# Patient Record
Sex: MALE | Race: WHITE | NOT HISPANIC OR LATINO | Employment: OTHER | URBAN - METROPOLITAN AREA
[De-identification: names, ages, dates, MRNs, and addresses within clinical notes are randomized per-mention and may not be internally consistent; named-entity substitution may affect disease eponyms.]

---

## 2019-02-12 DIAGNOSIS — M54.16 LUMBAR RADICULOPATHY, CHRONIC: ICD-10-CM

## 2019-02-12 DIAGNOSIS — M54.16 LUMBAR RADICULOPATHY: Primary | ICD-10-CM

## 2019-07-16 ENCOUNTER — OFFICE VISIT (OUTPATIENT)
Dept: FAMILY MEDICINE | Facility: CLINIC | Age: 69
End: 2019-07-16
Payer: MEDICARE

## 2019-07-16 VITALS
DIASTOLIC BLOOD PRESSURE: 60 MMHG | OXYGEN SATURATION: 97 % | BODY MASS INDEX: 30.88 KG/M2 | SYSTOLIC BLOOD PRESSURE: 119 MMHG | HEART RATE: 63 BPM | RESPIRATION RATE: 16 BRPM | WEIGHT: 228 LBS | HEIGHT: 72 IN

## 2019-07-16 DIAGNOSIS — E78.1 HYPERTRIGLYCERIDEMIA: ICD-10-CM

## 2019-07-16 DIAGNOSIS — M51.27 PROLAPSED LUMBOSACRAL INTERVERTEBRAL DISC: ICD-10-CM

## 2019-07-16 DIAGNOSIS — C61 ADENOCARCINOMA OF PROSTATE: Primary | ICD-10-CM

## 2019-07-16 DIAGNOSIS — R76.8 RHEUMATOID FACTOR POSITIVE: ICD-10-CM

## 2019-07-16 DIAGNOSIS — G89.29 CHRONIC RIGHT FLANK PAIN: ICD-10-CM

## 2019-07-16 DIAGNOSIS — R10.9 CHRONIC RIGHT FLANK PAIN: ICD-10-CM

## 2019-07-16 PROCEDURE — 99214 OFFICE O/P EST MOD 30 MIN: CPT | Mod: ICN,CMP,S$GLB, | Performed by: NURSE PRACTITIONER

## 2019-07-16 PROCEDURE — 99214 PR OFFICE/OUTPT VISIT, EST, LEVL IV, 30-39 MIN: ICD-10-PCS | Mod: ICN,CMP,S$GLB, | Performed by: NURSE PRACTITIONER

## 2019-07-16 RX ORDER — AMOXICILLIN 500 MG
CAPSULE ORAL DAILY
COMMUNITY

## 2019-07-16 NOTE — ASSESSMENT & PLAN NOTE
Discussed possible pathologies including adhesions from surgery, Lumbar radiculopathy, and liver disease (fatty vs autoimmune vs cirrhosis).  Hepatitis C negative.    Discussed with patient that CT scan would be needed to investigate.  Given his upcoming surgery, would defer this until his pain level changes. He is scheduled for October, so we will discuss at that time.       Will review labs from upcoming procedure once received.

## 2019-07-16 NOTE — PROGRESS NOTES
Subjective:      Patient ID: Mike Rome is a 68 y.o. male.    Chief Complaint: 6mn f/u (Prediabetic, macrocytosis.)      Patient is here today for 6 month follow-up.  He reports no acute issues at this time.  He reports being seen by Urology and diagnosis with adeno carcinoma of the prostate.  He is scheduled to have laparoscopic removal on Wednesday.  He is currently asymptomatic.  He is followed by Dr. Miranda.           ROS:   Review of Systems   Constitutional: Negative.    Respiratory: Negative.    Cardiovascular: Negative.    Gastrointestinal: Negative.    Genitourinary: Negative.    Musculoskeletal: Positive for back pain (R flank (chronic)).   Skin: Negative.    Neurological: Negative.    Hematological: Negative.      Objective:   Physical Exam   Constitutional: He is oriented to person, place, and time. He appears well-developed and well-nourished. No distress.   HENT:   Head: Normocephalic and atraumatic.   Eyes: Pupils are equal, round, and reactive to light.   Neck: Normal range of motion.   Cardiovascular: Normal rate, regular rhythm and normal heart sounds.   Pulmonary/Chest: Effort normal and breath sounds normal.   Musculoskeletal: He exhibits no edema.   Neurological: He is alert and oriented to person, place, and time.   Skin: Skin is dry.   Psychiatric: He has a normal mood and affect. His behavior is normal. Judgment and thought content normal.   Nursing note and vitals reviewed.    Assessment:     1. Adenocarcinoma of prostate    2. Rheumatoid factor positive    3. Hypertriglyceridemia    4. Prolapsed lumbosacral intervertebral disc    5. Chronic right flank pain      Plan:     Problem List Items Addressed This Visit        Neuro    Prolapsed lumbosacral intervertebral disc    Overview     Was referred to PT for evaluation. Reports unable to reproduce pain at that time, so he stopped going to PT.    See MRI results for L4-5 pathology.          Current Assessment & Plan     Discussed possible  causes of the pain in his right flank. Defers PT. States he can do the exercises at home. Pain is 1/10 and only when seated.             Immunology/Multi System    Rheumatoid factor positive    Overview     Followed by Dr. Velazquez.     Was offered to come back in 1 year. PT deferred 2/t asymptomatic.          Current Assessment & Plan     Continue to monitor         Relevant Orders    Lipid panel       Oncology    Adenocarcinoma of prostate - Primary    Overview     T2 high volume [place] 4+3 equals 7 adenocarcinoma.     Followed by Dr. Miranda.          Relevant Orders    Lipid panel       GI    Chronic right flank pain    Overview     Sitting.  1/10 when present. No radiculopathy. GB removed.     US showed some fatty liver. Kidneys normal in appearance per US.    MRI showed possible pathology. Deferred PT evaluation.    Hep C negative.          Current Assessment & Plan     Discussed possible pathologies including adhesions from surgery, Lumbar radiculopathy, and liver disease (fatty vs autoimmune vs cirrhosis).  Hepatitis C negative.    Discussed with patient that CT scan would be needed to investigate.  Given his upcoming surgery, would defer this until his pain level changes. He is scheduled for October, so we will discuss at that time.       Will review labs from upcoming procedure once received.            Other Visit Diagnoses     Hypertriglyceridemia        Relevant Orders    Lipid panel

## 2019-07-17 LAB
CHOLEST SERPL-MSCNC: 190 MG/DL
HDL/CHOLESTEROL RATIO: 7 RATIO
HDLC SERPL-MCNC: 25 MG/DL (ref 32–72)
LDLC SERPL CALC-MCNC: 98 MG/DL
TRIGL SERPL-MCNC: 339 MG/DL (ref 30–200)
VLDL CHOLESTEROL: 67 MG/DL (ref 0–40)

## 2019-10-10 ENCOUNTER — OFFICE VISIT (OUTPATIENT)
Dept: FAMILY MEDICINE | Facility: CLINIC | Age: 69
End: 2019-10-10
Payer: MEDICARE

## 2019-10-10 VITALS
SYSTOLIC BLOOD PRESSURE: 120 MMHG | OXYGEN SATURATION: 96 % | DIASTOLIC BLOOD PRESSURE: 58 MMHG | BODY MASS INDEX: 28.31 KG/M2 | RESPIRATION RATE: 16 BRPM | WEIGHT: 209 LBS | HEART RATE: 56 BPM | HEIGHT: 72 IN

## 2019-10-10 DIAGNOSIS — Z00.00 MEDICARE ANNUAL WELLNESS VISIT, SUBSEQUENT: Primary | ICD-10-CM

## 2019-10-10 DIAGNOSIS — C61 ADENOCARCINOMA OF PROSTATE: ICD-10-CM

## 2019-10-10 DIAGNOSIS — Z79.899 LONG TERM USE OF DRUG: ICD-10-CM

## 2019-10-10 PROCEDURE — 99397 PR PREVENTIVE VISIT,EST,65 & OVER: ICD-10-PCS | Mod: S$GLB,,, | Performed by: NURSE PRACTITIONER

## 2019-10-10 PROCEDURE — 99397 PER PM REEVAL EST PAT 65+ YR: CPT | Mod: S$GLB,,, | Performed by: NURSE PRACTITIONER

## 2019-10-10 NOTE — PROGRESS NOTES
Mike Rome presented for a follow-up Medicare AWV today. The following components were reviewed and updated:    · Medical history  · Family History  · Social history  · Allergies and Current Medications  · Health Risk Assessment  · Health Maintenance  · Care Team    **See Completed Assessments for Annual Wellness visit with in the encounter summary    The following assessments were completed:  · Depression Screening  · Cognitive function Screening  · Timed Get Up Test  · Whisper Test    Vitals:    10/10/19 0929   BP: (!) 120/58   Pulse: (!) 56   Resp: 16   SpO2: 96%   Weight: 94.8 kg (209 lb)   Height: 6' (1.829 m)     Body mass index is 28.35 kg/m².   ]        Diagnoses and health risks identified today and associated recommendations/orders:  1. Medicare annual wellness visit, subsequent  Wife has upcoming visit next Thursday. He will get labs tomorrow and we will discuss at her visit.   - Lipid panel; Future  - CBC auto differential; Future  - Comprehensive metabolic panel; Future  - Hemoglobin A1c; Future  - TSH; Future  - Lipid panel  - CBC auto differential  - Comprehensive metabolic panel  - Hemoglobin A1c  - TSH    2. Adenocarcinoma of prostate    PT is s/p Prostatectomy back in July. Doing well at this time. No complaints other than occasional dribbling.                         3. Long term use of drug    - Lipid panel; Future  - CBC auto differential; Future  - Comprehensive metabolic panel; Future  - Hemoglobin A1c; Future  - TSH; Future  - Lipid panel  - CBC auto differential  - Comprehensive metabolic panel  - Hemoglobin A1c  - TSH      Provided Mike with a 5-10 year written screening schedule and personal prevention plan. Recommendations were developed using the USPSTF age appropriate recommendations. Education, counseling, and referrals were provided as needed.  After Visit Summary printed and given to patient which includes a list of additional screenings\tests needed.    Follow up in about 6  months (around 4/10/2020).      Skyler Francis NP

## 2019-10-14 ENCOUNTER — TELEPHONE (OUTPATIENT)
Dept: FAMILY MEDICINE | Facility: CLINIC | Age: 69
End: 2019-10-14

## 2019-10-14 DIAGNOSIS — D72.810 LYMPHOCYTOPENIA: Primary | ICD-10-CM

## 2019-10-14 DIAGNOSIS — E78.5 HYPERLIPIDEMIA, UNSPECIFIED HYPERLIPIDEMIA TYPE: ICD-10-CM

## 2019-10-14 NOTE — TELEPHONE ENCOUNTER
WBC 4.13 - Third low value since 2017. Recommend B12, B6, Folate, Vit D, iron, zinc testing to r/o Vit Def as cause of leukopenia/lymphocytopenia. History of Macrocytosis.     Start Cholesterol med.     TSH nml

## 2019-10-15 NOTE — TELEPHONE ENCOUNTER
Pt notified - only add-on's not done is zinc & B6. Pt states he and his wife have apts tomorrow. But he will get the labs done in the next 2 days and f/u w/ us regarding other results/choles med.

## 2019-10-17 RX ORDER — SIMVASTATIN 20 MG/1
20 TABLET, FILM COATED ORAL NIGHTLY
Qty: 90 TABLET | Refills: 3 | Status: SHIPPED | OUTPATIENT
Start: 2019-10-17 | End: 2020-10-23

## 2019-10-17 RX ORDER — ACETAMINOPHEN, DEXTROMETHORPHAN HBR, DOXYLAMINE SUCCINATE, PHENYLEPHRINE HCL 650; 20; 12.5; 1 MG/30ML; MG/30ML; MG/30ML; MG/30ML
1 SOLUTION ORAL DAILY
Qty: 100 EACH | Refills: 3 | Status: SHIPPED | OUTPATIENT
Start: 2019-10-17 | End: 2021-06-21

## 2019-10-17 RX ORDER — ASPIRIN 325 MG
50000 TABLET, DELAYED RELEASE (ENTERIC COATED) ORAL WEEKLY
Qty: 12 CAPSULE | Refills: 0 | Status: SHIPPED | OUTPATIENT
Start: 2019-10-17 | End: 2021-06-21

## 2019-10-17 RX ORDER — FOLIC ACID 0.4 MG
400 TABLET ORAL DAILY
Refills: 0 | COMMUNITY
Start: 2019-10-17 | End: 2021-06-21

## 2019-10-17 NOTE — TELEPHONE ENCOUNTER
{PT here today w/ his wife for AWV.     Notified of abnormal lab values.     Vit D 26  B12  - 296  Folate - 4.8   Iron - wnl   Mg - 2.6   B6 - pending  Zinc - pending    R/o Pernicious Anemia. Start Oral Vit D and B12. Recommend repeat CBC in 6 weeks as well as B12/folate levels.     Given CBC, B12 order to be done on 11/25/19. RX of B12, folate, Vit D sent to pharmacy.     Also sent Simvastatin given .

## 2019-11-26 ENCOUNTER — TELEPHONE (OUTPATIENT)
Dept: FAMILY MEDICINE | Facility: CLINIC | Age: 69
End: 2019-11-26

## 2019-11-26 NOTE — TELEPHONE ENCOUNTER
WBC improved - confirmed B12/folate cause.   RBC 4.8, hgb 15.4, hct 48.3    - macrocytosis  MCH 32     B12 - 492 (on supplements).  Prior 296   Folate 4.8  (on folvite 400) -  Prior 4.8     Will determine if taking supplements.      MTHFR vs Pernicious anemia  -  Plan to test IF Ab and start Methylfolate if negative.

## 2019-12-03 ENCOUNTER — OFFICE VISIT (OUTPATIENT)
Dept: FAMILY MEDICINE | Facility: CLINIC | Age: 69
End: 2019-12-03
Payer: MEDICARE

## 2019-12-03 VITALS
DIASTOLIC BLOOD PRESSURE: 67 MMHG | BODY MASS INDEX: 28.31 KG/M2 | HEART RATE: 70 BPM | RESPIRATION RATE: 14 BRPM | SYSTOLIC BLOOD PRESSURE: 152 MMHG | WEIGHT: 209 LBS | HEIGHT: 72 IN | OXYGEN SATURATION: 96 %

## 2019-12-03 DIAGNOSIS — D51.8 MACROCYTIC ANEMIA WITH VITAMIN B12 DEFICIENCY: Primary | ICD-10-CM

## 2019-12-03 PROCEDURE — 1159F MED LIST DOCD IN RCRD: CPT | Mod: S$GLB,,, | Performed by: NURSE PRACTITIONER

## 2019-12-03 PROCEDURE — 1126F PR PAIN SEVERITY QUANTIFIED, NO PAIN PRESENT: ICD-10-PCS | Mod: S$GLB,,, | Performed by: NURSE PRACTITIONER

## 2019-12-03 PROCEDURE — 99213 PR OFFICE/OUTPT VISIT, EST, LEVL III, 20-29 MIN: ICD-10-PCS | Mod: S$GLB,,, | Performed by: NURSE PRACTITIONER

## 2019-12-03 PROCEDURE — 1159F PR MEDICATION LIST DOCUMENTED IN MEDICAL RECORD: ICD-10-PCS | Mod: S$GLB,,, | Performed by: NURSE PRACTITIONER

## 2019-12-03 PROCEDURE — 99213 OFFICE O/P EST LOW 20 MIN: CPT | Mod: S$GLB,,, | Performed by: NURSE PRACTITIONER

## 2019-12-03 PROCEDURE — 1126F AMNT PAIN NOTED NONE PRSNT: CPT | Mod: S$GLB,,, | Performed by: NURSE PRACTITIONER

## 2019-12-03 RX ORDER — LEVOMEFOLATE CALCIUM 7.5 MG
7.5 TABLET ORAL DAILY
Qty: 90 TABLET | Refills: 0 | Status: SHIPPED | OUTPATIENT
Start: 2019-12-03 | End: 2022-01-11

## 2019-12-03 NOTE — ASSESSMENT & PLAN NOTE
Lmethylfolate 7.5 mg sent to [facility].  PT will stop B12/Folic acid once RX has been received. Suspect he may have MTHFR given his lack of response to oral supplements.     See overview.

## 2019-12-03 NOTE — PROGRESS NOTES
Subjective:      Patient ID: Mike Rome is a 69 y.o. male.    Chief Complaint: Follow-up (Here to disucss labs.)      Patient is here today to discuss folic acid and B12 deficiency.  He is currently on B12 1000 mcg daily as well as folic acid 400 mcg daily.  He reports no acute issues at this time.     Past Medical History:   Diagnosis Date    Abnormal prostate specific antigen (PSA)     Cholelithiasis without obstruction     Erectile dysfunction     Gallstone     Hyperglycemia     Hyperlipidemia     Polyp of colon     Prediabetes     Prolapsed lumbosacral intervertebral disc     Rheumatoid factor positive     Shoulder joint pain     rt      Social History     Socioeconomic History    Marital status:      Spouse name: Not on file    Number of children: Not on file    Years of education: Not on file    Highest education level: Not on file   Occupational History    Not on file   Social Needs    Financial resource strain: Not on file    Food insecurity:     Worry: Not on file     Inability: Not on file    Transportation needs:     Medical: Not on file     Non-medical: Not on file   Tobacco Use    Smoking status: Current Every Day Smoker     Packs/day: 1.00     Years: 50.00     Pack years: 50.00     Types: Cigarettes    Smokeless tobacco: Never Used   Substance and Sexual Activity    Alcohol use: Yes     Frequency: 2-4 times a month    Drug use: Never    Sexual activity: Not on file   Lifestyle    Physical activity:     Days per week: Not on file     Minutes per session: Not on file    Stress: Not on file   Relationships    Social connections:     Talks on phone: Not on file     Gets together: Not on file     Attends Mosque service: Not on file     Active member of club or organization: Not on file     Attends meetings of clubs or organizations: Not on file     Relationship status: Not on file   Other Topics Concern    Not on file   Social History Narrative    Not on file       History reviewed. No pertinent family history.     ROS:   Review of Systems   Constitutional: Negative.    Respiratory: Negative.    Cardiovascular: Negative.    Gastrointestinal: Negative.    Genitourinary: Negative.    Musculoskeletal: Positive for back pain (R flank (chronic)).   Skin: Negative.    Neurological: Negative.    Hematological: Negative.      Objective:   Physical Exam   Constitutional: He is oriented to person, place, and time. He appears well-developed and well-nourished. No distress.   HENT:   Head: Normocephalic and atraumatic.   Eyes: Pupils are equal, round, and reactive to light.   Neck: Normal range of motion.   Cardiovascular: Normal rate, regular rhythm and normal heart sounds.   Pulmonary/Chest: Effort normal and breath sounds normal.   Musculoskeletal: He exhibits no edema.   Neurological: He is alert and oriented to person, place, and time.   Skin: Skin is dry.   Psychiatric: He has a normal mood and affect. His behavior is normal. Judgment and thought content normal.   Nursing note and vitals reviewed.    Assessment:     1. Macrocytic anemia with vitamin B12 deficiency      Plan:     Problem List Items Addressed This Visit        Oncology    Macrocytic anemia with vitamin B12 deficiency - Primary    Overview     WBC improved - confirmed B12/folate cause.   RBC 4.8, hgb 15.4, hct 48.3    - macrocytosis  MCH 32      B12 - 492 (on supplements).  Prior 296   Folate 4.8  (on folvite 400) -  Prior 4.8      MTHFR vs Pernicious anemia  -  Plan to start Methylfolate         Current Assessment & Plan     Lmethylfolate 7.5 mg sent to [facility].  PT will stop B12/Folic acid once RX has been received. Suspect he may have MTHFR given his lack of response to oral supplements.     See overview.          Relevant Medications    levomefolate calcium (L-METHYLFOLATE) 7.5 mg Tab tablet        All lab results discussed with patient.  Patient voiced understanding of the above.  All questions were  answered to his liking.     Follow up:     There are no Patient Instructions on file for this visit.     Follow up in about 6 months (around 6/3/2020).

## 2020-06-18 ENCOUNTER — OFFICE VISIT (OUTPATIENT)
Dept: FAMILY MEDICINE | Facility: CLINIC | Age: 70
End: 2020-06-18
Payer: MEDICARE

## 2020-06-18 VITALS
HEART RATE: 60 BPM | RESPIRATION RATE: 18 BRPM | TEMPERATURE: 98 F | OXYGEN SATURATION: 98 % | DIASTOLIC BLOOD PRESSURE: 68 MMHG | BODY MASS INDEX: 29.66 KG/M2 | WEIGHT: 219 LBS | SYSTOLIC BLOOD PRESSURE: 128 MMHG | HEIGHT: 72 IN

## 2020-06-18 DIAGNOSIS — Z79.899 LONG TERM USE OF DRUG: ICD-10-CM

## 2020-06-18 DIAGNOSIS — D51.8 MACROCYTIC ANEMIA WITH VITAMIN B12 DEFICIENCY: Primary | ICD-10-CM

## 2020-06-18 DIAGNOSIS — E78.5 HYPERLIPIDEMIA, UNSPECIFIED HYPERLIPIDEMIA TYPE: ICD-10-CM

## 2020-06-18 DIAGNOSIS — C61 ADENOCARCINOMA OF PROSTATE: ICD-10-CM

## 2020-06-18 PROCEDURE — 99213 PR OFFICE/OUTPT VISIT, EST, LEVL III, 20-29 MIN: ICD-10-PCS | Mod: S$GLB,,, | Performed by: NURSE PRACTITIONER

## 2020-06-18 PROCEDURE — 99213 OFFICE O/P EST LOW 20 MIN: CPT | Mod: S$GLB,,, | Performed by: NURSE PRACTITIONER

## 2020-06-18 NOTE — PROGRESS NOTES
Subjective:      Patient ID: Mike Rome is a 69 y.o. male.    Chief Complaint: Follow-up (6mn)      69-year-old male with a history of hyperlipidemia, ED, rheumatoid factor positive, adenocarcinoma prostate, macrocytic anemia with vitamin B12 deficiency, prediabetes, cholelithiasis, chronic right flank pain, colon polyp    During the patient's last encounter, he was found to be unresponsive to folvite 400 mcg and cyanocobalamin 1000 mcg.  He was  instructed to get L methyl folate 7.5 mg.  He is currently taking this once daily as instructed.  No side effects from medication.       Patient is here today for 6 month follow-up.  He reports no acute issues at this time.        In regards to his adenocarcinoma the prostate, the patient is followed by Urology. His Urologist is Dr. Miranda.  He is s/p prostatectomy.  He is scheduled to see him tomorrow.  He will have his PSA checked in office.         Past Medical History:   Diagnosis Date    Abnormal prostate specific antigen (PSA)     Cholelithiasis without obstruction     Erectile dysfunction     Gallstone     Hyperglycemia     Hyperlipidemia     Polyp of colon     Prediabetes     Prolapsed lumbosacral intervertebral disc     Rheumatoid factor positive     Shoulder joint pain     rt      Social History     Socioeconomic History    Marital status:      Spouse name: Not on file    Number of children: Not on file    Years of education: Not on file    Highest education level: Not on file   Occupational History    Not on file   Social Needs    Financial resource strain: Not on file    Food insecurity     Worry: Not on file     Inability: Not on file    Transportation needs     Medical: Not on file     Non-medical: Not on file   Tobacco Use    Smoking status: Current Every Day Smoker     Packs/day: 1.00     Years: 50.00     Pack years: 50.00     Types: Cigarettes    Smokeless tobacco: Never Used   Substance and Sexual Activity    Alcohol use: Yes      Frequency: 2-4 times a month    Drug use: Never    Sexual activity: Not on file   Lifestyle    Physical activity     Days per week: Not on file     Minutes per session: Not on file    Stress: Not on file   Relationships    Social connections     Talks on phone: Not on file     Gets together: Not on file     Attends Jainism service: Not on file     Active member of club or organization: Not on file     Attends meetings of clubs or organizations: Not on file     Relationship status: Not on file   Other Topics Concern    Not on file   Social History Narrative    Not on file      History reviewed. No pertinent family history.     ROS:   Review of Systems   Constitutional: Negative.    Respiratory: Negative.    Cardiovascular: Negative.    Gastrointestinal: Negative.    Genitourinary: Negative.    Musculoskeletal: Positive for back pain (R flank (chronic)).   Skin: Negative.    Neurological: Negative.    Hematological: Negative.      Objective:   Physical Exam  Vitals signs and nursing note reviewed.   Constitutional:       General: He is not in acute distress.     Appearance: Normal appearance. He is well-developed. He is not ill-appearing.   HENT:      Head: Normocephalic and atraumatic.      Nose: Nose normal.      Mouth/Throat:      Mouth: Mucous membranes are moist.   Eyes:      General: No scleral icterus.     Pupils: Pupils are equal, round, and reactive to light.   Neck:      Musculoskeletal: Normal range of motion.   Cardiovascular:      Rate and Rhythm: Normal rate and regular rhythm.      Heart sounds: Normal heart sounds. No murmur.   Pulmonary:      Effort: Pulmonary effort is normal.      Breath sounds: Normal breath sounds.   Abdominal:      General: Abdomen is flat.   Musculoskeletal:      Right lower leg: No edema.      Left lower leg: No edema.   Skin:     General: Skin is dry.      Findings: No rash.   Neurological:      Mental Status: He is alert and oriented to person, place, and time.    Psychiatric:         Mood and Affect: Mood normal.         Behavior: Behavior normal.         Thought Content: Thought content normal.         Judgment: Judgment normal.       Assessment:     1. Macrocytic anemia with vitamin B12 deficiency    2. Adenocarcinoma of prostate    3. Hyperlipidemia, unspecified hyperlipidemia type    4. Long term use of drug      No images are attached to the encounter.   Plan:     Problem List Items Addressed This Visit        Cardiac/Vascular    Hyperlipidemia    Current Assessment & Plan     Currently on simvastatin.  Faithful with medication.  No side effects reported.    Get lipid and compared to prior         Relevant Orders    CBC auto differential    Comprehensive metabolic panel    Lipid Panel    Hemoglobin A1C    TSH    Vitamin B12/folate, serum panel       Oncology    Adenocarcinoma of prostate    Overview     T2 high volume [place] 4+3 equals 7 adenocarcinoma.     Followed by Dr. Miranda.          Current Assessment & Plan     Patient is status post prostatectomy.  Defer PSA to urologist.  Patient instructed to send a copy of labs to our office.          Relevant Orders    CBC auto differential    Comprehensive metabolic panel    Lipid Panel    Hemoglobin A1C    TSH    Vitamin B12/folate, serum panel    Macrocytic anemia with vitamin B12 deficiency - Primary    Overview     WBC improved - confirmed B12/folate cause.   RBC 4.8, hgb 15.4, hct 48.3    - macrocytosis  MCH 32      B12 - 492 (on supplements).  Prior 296   Folate 4.8  (on folvite 400) -  Prior 4.8      MTHFR vs Pernicious anemia  -  Plan to start Methylfolate         Current Assessment & Plan     Patient is currently on L methyl folate 7.5 mg.  Will compare CBC and B12/folate to prior labs.          Relevant Orders    CBC auto differential    Comprehensive metabolic panel    Lipid Panel    Hemoglobin A1C    TSH    Vitamin B12/folate, serum panel      Other Visit Diagnoses     Long term use of drug         Relevant Orders    CBC auto differential    Comprehensive metabolic panel    Lipid Panel    Hemoglobin A1C    TSH    Vitamin B12/folate, serum panel        Patient was given a copy of his orders and instructed to get them done we has his PSA drawn.  He was also instructed to call the office if he has not heard from me in 1 week after his labs are done.  Patient doing well overall.  He will follow-up in 6 months.  Patient voiced understanding of all instructions provided.  All questions are answered to his liking.    Follow up:     There are no Patient Instructions on file for this visit.     Follow up in about 6 months (around 12/18/2020), or if symptoms worsen or fail to improve.

## 2020-06-18 NOTE — ASSESSMENT & PLAN NOTE
Patient is status post prostatectomy.  Defer PSA to urologist.  Patient instructed to send a copy of labs to our office.

## 2020-06-18 NOTE — ASSESSMENT & PLAN NOTE
Currently on simvastatin.  Faithful with medication.  No side effects reported.    Get lipid and compared to prior

## 2020-07-02 ENCOUNTER — TELEPHONE (OUTPATIENT)
Dept: FAMILY MEDICINE | Facility: CLINIC | Age: 70
End: 2020-07-02

## 2020-07-02 NOTE — TELEPHONE ENCOUNTER
Lymphocytopenia. - ? Still on B12? 2/t Rheumatoid.     B12 - 337 - still taking L-methylfolate?   Folate > 20     CMP unremarkable    Total cholesterol 123  LDL 72    HDL is not at goal- 26.  Normal is above 40.  Recommend exercise as best he can.

## 2020-07-27 ENCOUNTER — OFFICE VISIT (OUTPATIENT)
Dept: FAMILY MEDICINE | Facility: CLINIC | Age: 70
End: 2020-07-27
Payer: MEDICARE

## 2020-07-27 VITALS
DIASTOLIC BLOOD PRESSURE: 60 MMHG | SYSTOLIC BLOOD PRESSURE: 122 MMHG | OXYGEN SATURATION: 96 % | HEART RATE: 76 BPM | HEIGHT: 72 IN | BODY MASS INDEX: 29.7 KG/M2 | TEMPERATURE: 98 F | RESPIRATION RATE: 16 BRPM

## 2020-07-27 DIAGNOSIS — D72.810 LYMPHOCYTOPENIA: Primary | ICD-10-CM

## 2020-07-27 DIAGNOSIS — E78.5 HYPERLIPIDEMIA, UNSPECIFIED HYPERLIPIDEMIA TYPE: ICD-10-CM

## 2020-07-27 PROCEDURE — 99212 PR OFFICE/OUTPT VISIT, EST, LEVL II, 10-19 MIN: ICD-10-PCS | Mod: S$GLB,,, | Performed by: NURSE PRACTITIONER

## 2020-07-27 PROCEDURE — 99212 OFFICE O/P EST SF 10 MIN: CPT | Mod: S$GLB,,, | Performed by: NURSE PRACTITIONER

## 2020-07-27 NOTE — ASSESSMENT & PLAN NOTE
Lymphocytopenia. - ? Still on B12? 2/t Rheumatoid.      B12 - 337 - still taking L-methylfolate?   Folate > 20      CMP unremarkable     Total cholesterol 123 - Continue Simvastatin  LDL 72     HDL is not at goal- 26.  Normal is above 40.  Recommend exercise as best he can.

## 2020-07-27 NOTE — PROGRESS NOTES
Subjective:      Patient ID: Mike Rome is a 69 y.o. male.    Chief Complaint: Follow-up      69-year-old male with a history of hyperlipidemia, ED, rheumatoid factor positive, adenocarcinoma prostate, macrocytic anemia with vitamin B12 deficiency, prediabetes, cholelithiasis, chronic right flank pain, colon polyp    During the patient's last encounter, he was found to be unresponsive to folvite 400 mcg and cyanocobalamin 1000 mcg.  He was  instructed to get L methyl folate 7.5 mg.  He is currently taking this once daily as instructed.  No side effects from medication. Here to discuss labs r/t this.     No acute issues reported.             Follow-up      Past Medical History:   Diagnosis Date    Abnormal prostate specific antigen (PSA)     Cholelithiasis without obstruction     Erectile dysfunction     Gallstone     Hyperglycemia     Hyperlipidemia     Polyp of colon     Prediabetes     Prolapsed lumbosacral intervertebral disc     Rheumatoid factor positive     Shoulder joint pain     rt      Social History     Socioeconomic History    Marital status:      Spouse name: Not on file    Number of children: Not on file    Years of education: Not on file    Highest education level: Not on file   Occupational History    Not on file   Social Needs    Financial resource strain: Not on file    Food insecurity     Worry: Not on file     Inability: Not on file    Transportation needs     Medical: Not on file     Non-medical: Not on file   Tobacco Use    Smoking status: Current Every Day Smoker     Packs/day: 1.00     Years: 50.00     Pack years: 50.00     Types: Cigarettes    Smokeless tobacco: Never Used   Substance and Sexual Activity    Alcohol use: Yes     Frequency: 2-4 times a month    Drug use: Never    Sexual activity: Not on file   Lifestyle    Physical activity     Days per week: Not on file     Minutes per session: Not on file    Stress: Not on file   Relationships    Social  connections     Talks on phone: Not on file     Gets together: Not on file     Attends Zoroastrian service: Not on file     Active member of club or organization: Not on file     Attends meetings of clubs or organizations: Not on file     Relationship status: Not on file   Other Topics Concern    Not on file   Social History Narrative    Not on file      History reviewed. No pertinent family history.     ROS:   Review of Systems   Constitutional: Negative.    Respiratory: Negative.    Cardiovascular: Negative.    Gastrointestinal: Negative.    Genitourinary: Negative.    Musculoskeletal: Positive for back pain (R flank (chronic)).   Skin: Negative.    Neurological: Negative.    Hematological: Negative.      Objective:   Physical Exam  Nursing note reviewed.   Constitutional:       General: He is not in acute distress.     Appearance: Normal appearance. He is well-developed. He is not ill-appearing or toxic-appearing.      Comments: Physical exam limited by telemedicine visit   HENT:      Head: Normocephalic.   Cardiovascular:      Rate and Rhythm: Normal rate.   Pulmonary:      Effort: Pulmonary effort is normal.   Skin:     Findings: No rash.   Neurological:      Mental Status: He is alert and oriented to person, place, and time. Mental status is at baseline.   Psychiatric:         Mood and Affect: Mood normal.         Speech: Speech normal.         Behavior: Behavior normal. Behavior is cooperative.         Thought Content: Thought content normal.         Judgment: Judgment normal.       Assessment:     1. Lymphocytopenia    2. Hyperlipidemia, unspecified hyperlipidemia type      No images are attached to the encounter.   Plan:     Problem List Items Addressed This Visit        Cardiac/Vascular    Hyperlipidemia    Current Assessment & Plan     Lymphocytopenia. - ? Still on B12? 2/t Rheumatoid.      B12 - 337 - still taking L-methylfolate?   Folate > 20      CMP unremarkable     Total cholesterol 123 - Continue  Simvastatin  LDL 72     HDL is not at goal- 26.  Normal is above 40.  Recommend exercise as best he can.             Oncology    Lymphocytopenia - Primary    Current Assessment & Plan     Macrocytic anemia improved with L-methylfolate 7.5 mg.       WBC/lymphocytes borderline abnormal.  Likely secondary to low B12 337.    Recommend he increase his L-methylfolate to 15 mg daily.  Continue to monitor CBC Q 6 months.              A1c 5.9    TSH WNL      All diagnostic data (labs/imaging) was reviewed with the patient and/or family member in the room.  All questions were answered to their liking. The patient and/or family member voiced understanding of all instructions provided. Expectations regarding follow up and treatment plan were voiced and confirmed prior to departure. The patient was given orders/instructions at the end of the visit for reference.     Follow up:     There are no Patient Instructions on file for this visit.     Follow up in about 6 months (around 1/27/2021), or if symptoms worsen or fail to improve.

## 2020-07-27 NOTE — ASSESSMENT & PLAN NOTE
Macrocytic anemia improved with L-methylfolate 7.5 mg.       WBC/lymphocytes borderline abnormal.  Likely secondary to low B12 337.    Recommend he increase his L-methylfolate to 15 mg daily.  Continue to monitor CBC Q 6 months.

## 2020-12-18 ENCOUNTER — OFFICE VISIT (OUTPATIENT)
Dept: FAMILY MEDICINE | Facility: CLINIC | Age: 70
End: 2020-12-18
Payer: MEDICARE

## 2020-12-18 VITALS
RESPIRATION RATE: 16 BRPM | BODY MASS INDEX: 28.58 KG/M2 | TEMPERATURE: 97 F | OXYGEN SATURATION: 99 % | SYSTOLIC BLOOD PRESSURE: 126 MMHG | WEIGHT: 211 LBS | HEART RATE: 44 BPM | HEIGHT: 72 IN | DIASTOLIC BLOOD PRESSURE: 60 MMHG

## 2020-12-18 DIAGNOSIS — R30.0 BURNING WITH URINATION: Primary | ICD-10-CM

## 2020-12-18 DIAGNOSIS — E78.5 HYPERLIPIDEMIA, UNSPECIFIED HYPERLIPIDEMIA TYPE: ICD-10-CM

## 2020-12-18 DIAGNOSIS — C61 PROSTATE CANCER: ICD-10-CM

## 2020-12-18 PROCEDURE — 99213 PR OFFICE/OUTPT VISIT, EST, LEVL III, 20-29 MIN: ICD-10-PCS | Mod: S$GLB,,, | Performed by: NURSE PRACTITIONER

## 2020-12-18 PROCEDURE — 99213 OFFICE O/P EST LOW 20 MIN: CPT | Mod: S$GLB,,, | Performed by: NURSE PRACTITIONER

## 2020-12-18 RX ORDER — OXYBUTYNIN CHLORIDE 5 MG/1
TABLET ORAL
COMMUNITY
Start: 2020-12-04 | End: 2022-01-11

## 2020-12-18 NOTE — PROGRESS NOTES
Subjective:      Patient ID: Mike Rome is a 70 y.o. male.    Chief Complaint: Follow-up (6MN FU.)      69-year-old male with a history of hyperlipidemia, ED, rheumatoid factor positive, adenocarcinoma prostate, macrocytic anemia with vitamin B12 deficiency, prediabetes, cholelithiasis, chronic right flank pain, colon polyp, prostate Ca w/ removal.    During the patient's last encounter, he was found to be unresponsive to folvite 400 mcg and cyanocobalamin 1000 mcg.  He was  instructed to get L methyl folate 7.5 mg.  He is currently taking this once daily as instructed.  No side effects from medication.     Recently received 35 treatments of radiation.  Prostate was removed approximately 1 year ago.  He is followed by Dr. Garcia @ [facility]. Has burning urination 2/t chemo. Currently on Oxybutinin and AZO standard. Seems to be controlling his symptoms. Recent UA negative.     Unfortunately the patient just lost his wife within the past 6 months.  He seems to be coping well.  Denies depression, HI, SI, anger.      No acute issues reported.             Past Medical History:   Diagnosis Date    Abnormal prostate specific antigen (PSA)     Cholelithiasis without obstruction     Erectile dysfunction     Gallstone     Hyperglycemia     Hyperlipidemia     Polyp of colon     Prediabetes     Prolapsed lumbosacral intervertebral disc     Rheumatoid factor positive     Shoulder joint pain     rt      Social History     Socioeconomic History    Marital status:      Spouse name: Not on file    Number of children: Not on file    Years of education: Not on file    Highest education level: Not on file   Occupational History    Not on file   Social Needs    Financial resource strain: Not on file    Food insecurity     Worry: Not on file     Inability: Not on file    Transportation needs     Medical: Not on file     Non-medical: Not on file   Tobacco Use    Smoking status: Current Every Day Smoker     Packs/day:  1.00     Years: 50.00     Pack years: 50.00     Types: Cigarettes    Smokeless tobacco: Never Used   Substance and Sexual Activity    Alcohol use: Yes     Frequency: 2-4 times a month    Drug use: Never    Sexual activity: Not on file   Lifestyle    Physical activity     Days per week: Not on file     Minutes per session: Not on file    Stress: Not on file   Relationships    Social connections     Talks on phone: Not on file     Gets together: Not on file     Attends Jain service: Not on file     Active member of club or organization: Not on file     Attends meetings of clubs or organizations: Not on file     Relationship status: Not on file   Other Topics Concern    Not on file   Social History Narrative    Not on file      History reviewed. No pertinent family history.     ROS:   Review of Systems   Constitutional: Negative.    Respiratory: Negative.    Cardiovascular: Negative.    Gastrointestinal: Negative.    Genitourinary: Negative.    Musculoskeletal: Positive for back pain (R flank (chronic)).   Skin: Negative.    Neurological: Negative.    Hematological: Negative.      Objective:   Physical Exam  Vitals signs and nursing note reviewed.   Constitutional:       General: He is not in acute distress.     Appearance: Normal appearance. He is well-developed. He is not ill-appearing.   HENT:      Head: Normocephalic and atraumatic.      Nose: Nose normal.      Mouth/Throat:      Mouth: Mucous membranes are moist.   Eyes:      General: No scleral icterus.     Pupils: Pupils are equal, round, and reactive to light.   Neck:      Musculoskeletal: Normal range of motion.   Cardiovascular:      Rate and Rhythm: Normal rate and regular rhythm.      Heart sounds: Normal heart sounds. No murmur.   Pulmonary:      Effort: Pulmonary effort is normal.      Breath sounds: Normal breath sounds.   Abdominal:      General: Abdomen is flat.   Musculoskeletal:      Right lower leg: No edema.      Left lower leg: No  edema.   Skin:     General: Skin is dry.      Findings: No rash.   Neurological:      Mental Status: He is alert and oriented to person, place, and time.   Psychiatric:         Mood and Affect: Mood normal.         Behavior: Behavior normal.         Thought Content: Thought content normal.         Judgment: Judgment normal.       Assessment:     1. Burning with urination    2. Hyperlipidemia, unspecified hyperlipidemia type    3. Prostate cancer      No images are attached to the encounter.   Plan:     Problem List Items Addressed This Visit        Cardiac/Vascular    Hyperlipidemia    Current Assessment & Plan     On statin. Will repeat labs in 6 mths.             Oncology    Prostate cancer    Current Assessment & Plan     Recently completed 35 treatments of Radiation. Followed by Dr. Garcia.            Other Visit Diagnoses     Burning with urination    -  Primary    2/t recent prostate radiation.         Patient will return to clinic in 6 months for annual wellness visit      Procedures       All diagnostic data (labs/imaging) was reviewed with the patient and/or family member in the room.  All questions were answered to their liking. The patient and/or family member voiced understanding of all instructions provided. Expectations regarding follow up and treatment plan were voiced and confirmed prior to departure. The patient was given orders/instructions at the end of the visit for reference.     Follow up:     There are no Patient Instructions on file for this visit.     Follow up in about 6 months (around 6/18/2021), or if symptoms worsen or fail to improve.

## 2021-06-21 ENCOUNTER — OFFICE VISIT (OUTPATIENT)
Dept: FAMILY MEDICINE | Facility: CLINIC | Age: 71
End: 2021-06-21
Payer: MEDICARE

## 2021-06-21 VITALS
WEIGHT: 216.31 LBS | SYSTOLIC BLOOD PRESSURE: 134 MMHG | HEIGHT: 71 IN | TEMPERATURE: 98 F | HEART RATE: 61 BPM | OXYGEN SATURATION: 98 % | BODY MASS INDEX: 30.28 KG/M2 | DIASTOLIC BLOOD PRESSURE: 60 MMHG

## 2021-06-21 DIAGNOSIS — J30.9 ALLERGIC SINUSITIS: ICD-10-CM

## 2021-06-21 DIAGNOSIS — R05.9 COUGH: Primary | ICD-10-CM

## 2021-06-21 PROCEDURE — 99213 OFFICE O/P EST LOW 20 MIN: CPT | Mod: 25,S$GLB,, | Performed by: NURSE PRACTITIONER

## 2021-06-21 PROCEDURE — 96372 PR INJECTION,THERAP/PROPH/DIAG2ST, IM OR SUBCUT: ICD-10-PCS | Mod: S$GLB,,, | Performed by: NURSE PRACTITIONER

## 2021-06-21 PROCEDURE — 96372 THER/PROPH/DIAG INJ SC/IM: CPT | Mod: S$GLB,,, | Performed by: NURSE PRACTITIONER

## 2021-06-21 PROCEDURE — 99213 PR OFFICE/OUTPT VISIT, EST, LEVL III, 20-29 MIN: ICD-10-PCS | Mod: 25,S$GLB,, | Performed by: NURSE PRACTITIONER

## 2021-06-21 RX ORDER — CODEINE PHOSPHATE AND GUAIFENESIN 10; 100 MG/5ML; MG/5ML
5 SOLUTION ORAL EVERY 4 HOURS PRN
Qty: 120 ML | Refills: 0 | Status: SHIPPED | OUTPATIENT
Start: 2021-06-21 | End: 2021-07-01

## 2022-01-11 ENCOUNTER — OFFICE VISIT (OUTPATIENT)
Dept: FAMILY MEDICINE | Facility: CLINIC | Age: 72
End: 2022-01-11
Payer: MEDICARE

## 2022-01-11 VITALS
BODY MASS INDEX: 30.68 KG/M2 | RESPIRATION RATE: 16 BRPM | HEART RATE: 69 BPM | WEIGHT: 220 LBS | DIASTOLIC BLOOD PRESSURE: 71 MMHG | SYSTOLIC BLOOD PRESSURE: 136 MMHG | OXYGEN SATURATION: 97 %

## 2022-01-11 DIAGNOSIS — Z79.899 LONG TERM CURRENT USE OF THERAPEUTIC DRUG: ICD-10-CM

## 2022-01-11 DIAGNOSIS — D72.810 LYMPHOCYTOPENIA: ICD-10-CM

## 2022-01-11 DIAGNOSIS — Z00.00 ANNUAL VISIT FOR GENERAL ADULT MEDICAL EXAMINATION WITHOUT ABNORMAL FINDINGS: Primary | ICD-10-CM

## 2022-01-11 DIAGNOSIS — E78.5 HYPERLIPIDEMIA, UNSPECIFIED HYPERLIPIDEMIA TYPE: ICD-10-CM

## 2022-01-11 PROCEDURE — 99397 PER PM REEVAL EST PAT 65+ YR: CPT | Mod: S$GLB,,, | Performed by: NURSE PRACTITIONER

## 2022-01-11 PROCEDURE — 99397 PR PREVENTIVE VISIT,EST,65 & OVER: ICD-10-PCS | Mod: S$GLB,,, | Performed by: NURSE PRACTITIONER

## 2022-01-11 NOTE — PROGRESS NOTES
Subjective:      Patient ID: Mike Rome is a 71 y.o. male.    Chief Complaint: Follow-up      71-year-old male with a history of hyperlipidemia, ED, rheumatoid factor positive, adenocarcinoma prostate, macrocytic anemia with vitamin B12 deficiency, prediabetes, cholelithiasis, chronic right flank pain, colon polyp     Here for AWV. He is no longer on medication at this time. No longer on vitamins. He feels well overall and feels he does not need them. He is scheduled to see Urology next month and set to have his PSA checked at that time.  UTD on all preventative measures.      No acute issues reported.     Past Medical History:   Diagnosis Date    Abnormal prostate specific antigen (PSA)     Cholelithiasis without obstruction     Erectile dysfunction     Gallstone     Hyperglycemia     Hyperlipidemia     Polyp of colon     Prediabetes     Prolapsed lumbosacral intervertebral disc     Rheumatoid factor positive     Shoulder joint pain     rt      Social History     Socioeconomic History    Marital status:    Tobacco Use    Smoking status: Current Every Day Smoker     Packs/day: 1.00     Years: 50.00     Pack years: 50.00     Types: Cigarettes    Smokeless tobacco: Never Used   Substance and Sexual Activity    Alcohol use: Yes    Drug use: Never      History reviewed. No pertinent family history.     ROS:   Review of Systems   Constitutional: Negative.    Respiratory: Negative.    Cardiovascular: Negative.    Gastrointestinal: Negative.    Genitourinary: Negative.    Musculoskeletal: Positive for back pain (R flank (chronic)).   Skin: Negative.    Neurological: Negative.    Hematological: Negative.      Objective:   Physical Exam  Vitals and nursing note reviewed.   Constitutional:       General: He is not in acute distress.     Appearance: Normal appearance. He is well-developed. He is not ill-appearing.   HENT:      Head: Normocephalic and atraumatic.      Nose: Nose normal.       Mouth/Throat:      Mouth: Mucous membranes are moist.   Eyes:      General: No scleral icterus.     Pupils: Pupils are equal, round, and reactive to light.   Neck:      Vascular: No carotid bruit.   Cardiovascular:      Rate and Rhythm: Normal rate and regular rhythm.      Heart sounds: Normal heart sounds. No murmur heard.  No friction rub. No gallop.    Pulmonary:      Effort: Pulmonary effort is normal.      Breath sounds: Normal breath sounds. No wheezing, rhonchi or rales.   Abdominal:      General: Abdomen is flat.   Musculoskeletal:      Cervical back: Normal range of motion.      Right lower leg: No edema.      Left lower leg: No edema.   Skin:     General: Skin is warm and dry.      Findings: No rash.   Neurological:      Mental Status: He is alert and oriented to person, place, and time. Mental status is at baseline.   Psychiatric:         Mood and Affect: Mood normal.         Behavior: Behavior normal.         Thought Content: Thought content normal.         Judgment: Judgment normal.       Assessment:     1. Annual visit for general adult medical examination without abnormal findings    2. Long term current use of therapeutic drug    3. Lymphocytopenia    4. Hyperlipidemia, unspecified hyperlipidemia type      No images are attached to the encounter.   Plan:     Problem List Items Addressed This Visit        Cardiac/Vascular    Hyperlipidemia    Current Assessment & Plan     No longer on Simvastatin.             Oncology    Lymphocytopenia    Relevant Orders    CBC (includes DIFF/PLT) w/Smear Review    Comprehensive Metabolic Panel    TSH    Hemoglobin A1C    Lipid Panel      Other Visit Diagnoses     Annual visit for general adult medical examination without abnormal findings    -  Primary    Has taken himself off all meds. WIll get AWV labs and call him w/ results. PSA to be done by Urology.     Relevant Orders    CBC (includes DIFF/PLT) w/Smear Review    Comprehensive Metabolic Panel    TSH     Hemoglobin A1C    Lipid Panel    Long term current use of therapeutic drug        Relevant Orders    CBC (includes DIFF/PLT) w/Smear Review    Comprehensive Metabolic Panel    TSH    Hemoglobin A1C    Lipid Panel        Procedures     No visits with results within 1 Month(s) from this visit.   Latest known visit with results is:   Office Visit on 07/16/2019   Component Date Value Ref Range Status    Cholesterol 07/17/2019 190  <200 mg/dL Final    Triglycerides 07/17/2019 339* 30 - 200 mg/dL Final    HDL 07/17/2019 25* 32 - 72 mg/dL Final    MAJOR RISK FACTOR FOR CHD      NEGATIVE RISK FOR CHD      HDL <35 mg/dL                HDL >60 mg/dL    LDL Cholesterol 07/17/2019 98.0  <130 mg/dL Final    VLDL 07/17/2019 67* 0 - 40 mg/dL Final    HDL/Cholesterol Ratio 07/17/2019 7  Ratio Final    Comment: Cholesterol to HDL Ratio    Risk    Ratio-Men    Ratio-Women                            Lowest  <3.8         <2.9                            Low     3.9 - 4.7    3.0 - 3.6                            Moderate 4.8 - 5.9   3.7 - 4.6                              High     6.0 - 6.9   4.7 - 5.6                            Highest  >7           >5.7          No results found in the last 30 days.     All diagnostic data (labs/imaging) was reviewed with the patient and/or family member in the room.  All questions were answered to their liking. The patient and/or family member voiced understanding of all instructions provided. Expectations regarding follow up and treatment plan were voiced and confirmed prior to departure. The patient was given orders/instructions at the end of the visit for reference. They were instructed to notify my office if they have not been contacted for imaging/referrals/labs/results in 1-2 weeks. They voiced understanding of all of the above.     Follow up:     There are no Patient Instructions on file for this visit.     Follow up in about 6 months (around 7/11/2022).

## 2022-01-26 ENCOUNTER — TELEPHONE (OUTPATIENT)
Dept: FAMILY MEDICINE | Facility: CLINIC | Age: 72
End: 2022-01-26
Payer: MEDICARE

## 2022-01-26 NOTE — TELEPHONE ENCOUNTER
----- Message from Sukh Anguiano LPN sent at 1/25/2022  3:31 PM CST -----  Contact: [facility] office    ----- Message -----  From: Natalie Nolasco  Sent: 1/25/2022   3:27 PM CST  To: [facility] Staff        Who Called: Luli     Does the patient know what this is regarding?: surgery scheduled 02/16/2021   Would the patient rather a call back or a response via [facility]?  Callback   Best Call Back Number: [phone] ext [phone]   Additional Information:  medical clearance needed

## 2022-06-14 NOTE — ASSESSMENT & PLAN NOTE
Discussed possible causes of the pain in his right flank. Defers PT. States he can do the exercises at home. Pain is 1/10 and only when seated.    16

## 2022-07-11 ENCOUNTER — OFFICE VISIT (OUTPATIENT)
Dept: FAMILY MEDICINE | Facility: CLINIC | Age: 72
End: 2022-07-11
Payer: MEDICARE

## 2022-07-11 VITALS
OXYGEN SATURATION: 97 % | HEART RATE: 57 BPM | DIASTOLIC BLOOD PRESSURE: 69 MMHG | WEIGHT: 229 LBS | SYSTOLIC BLOOD PRESSURE: 139 MMHG | BODY MASS INDEX: 31.94 KG/M2

## 2022-07-11 DIAGNOSIS — C61 PROSTATE CANCER: ICD-10-CM

## 2022-07-11 DIAGNOSIS — C61 ADENOCARCINOMA OF PROSTATE: ICD-10-CM

## 2022-07-11 DIAGNOSIS — D72.810 LYMPHOCYTOPENIA: Primary | ICD-10-CM

## 2022-07-11 PROCEDURE — 99213 PR OFFICE/OUTPT VISIT, EST, LEVL III, 20-29 MIN: ICD-10-PCS | Mod: S$GLB,,, | Performed by: NURSE PRACTITIONER

## 2022-07-11 PROCEDURE — 99213 OFFICE O/P EST LOW 20 MIN: CPT | Mod: S$GLB,,, | Performed by: NURSE PRACTITIONER

## 2022-07-11 NOTE — PROGRESS NOTES
Subjective:      Patient ID: Mike Rome is a 71 y.o. male.    Chief Complaint: Follow-up (6MN)      71-year-old male with a history of hyperlipidemia, ED, rheumatoid factor positive, adenocarcinoma prostate, macrocytic anemia with vitamin B12 deficiency, prediabetes, cholelithiasis, chronic right flank pain, colon polyp     Here for 6 month follow up. He is no longer on medication at this time. No longer on vitamins. He feels well overall and feels he does not need them.       He is followed by Urology, / [facility].  PSA initially 0.05 post radiation. Currently 0.2.  He was supposed to have a PET scan a month ago but can not get a hold of Dr. Miranda to order it.   Denies dysuria, hematuria, quency, dribbling.       UTD on all preventative measures.        Past Medical History:   Diagnosis Date    Abnormal prostate specific antigen (PSA)     Cholelithiasis without obstruction     Erectile dysfunction     Gallstone     Hyperglycemia     Hyperlipidemia     Polyp of colon     Prediabetes     Prolapsed lumbosacral intervertebral disc     Rheumatoid factor positive     Shoulder joint pain     rt      Social History     Socioeconomic History    Marital status:    Tobacco Use    Smoking status: Current Every Day Smoker     Packs/day: 1.00     Years: 50.00     Pack years: 50.00     Types: Cigarettes    Smokeless tobacco: Never Used   Substance and Sexual Activity    Alcohol use: Yes    Drug use: Never      History reviewed. No pertinent family history.     ROS:   Review of Systems   Constitutional: Negative.    Respiratory: Negative.    Cardiovascular: Negative.    Gastrointestinal: Negative.    Genitourinary: Negative.    Musculoskeletal: Positive for back pain (R flank (chronic)).   Skin: Negative.    Neurological: Negative.    Hematological: Negative.      Objective:   Physical Exam  Vitals and nursing note reviewed.   Constitutional:       General: He is not in acute distress.     Appearance:  Normal appearance. He is well-developed. He is not ill-appearing.   HENT:      Head: Normocephalic and atraumatic.      Nose: Nose normal.      Mouth/Throat:      Mouth: Mucous membranes are moist.   Eyes:      General: No scleral icterus.     Pupils: Pupils are equal, round, and reactive to light.   Neck:      Vascular: No carotid bruit.   Cardiovascular:      Rate and Rhythm: Normal rate and regular rhythm.      Heart sounds: Normal heart sounds. No murmur heard.    No friction rub. No gallop.   Pulmonary:      Effort: Pulmonary effort is normal.      Breath sounds: Normal breath sounds. No wheezing, rhonchi or rales.   Abdominal:      General: Abdomen is flat.   Musculoskeletal:      Cervical back: Normal range of motion.      Right lower leg: No edema.      Left lower leg: No edema.   Skin:     General: Skin is warm and dry.      Findings: No rash.   Neurological:      Mental Status: He is alert and oriented to person, place, and time. Mental status is at baseline.   Psychiatric:         Mood and Affect: Mood normal.         Behavior: Behavior normal.         Thought Content: Thought content normal.         Judgment: Judgment normal.       Assessment:     1. Lymphocytopenia    2. Adenocarcinoma of prostate    3. Prostate cancer      No images are attached to the encounter.   Plan:     Problem List Items Addressed This Visit        Oncology    Adenocarcinoma of prostate    Overview     T2 high volume [place] 4+3 equals 7 adenocarcinoma.     Followed by Dr. Miranda.            Current Assessment & Plan     Will order PET Scan on his behalf since he is unable to get in touch with Dr. Miranda.            Relevant Orders    NM PET Cu64 dotatate, skull to mid thigh    Lymphocytopenia - Primary    Current Assessment & Plan     Unfortunately we have not received the labs from January.  Will request records from Dr. Miranda.            Relevant Orders    NM PET Cu64 dotatate, skull to mid thigh    Prostate cancer    Current  Assessment & Plan     Recent increase in PSA to 0.2.  His initial PSA was 0.05                Procedures     No visits with results within 1 Month(s) from this visit.   Latest known visit with results is:   Office Visit on 07/16/2019   Component Date Value Ref Range Status    Cholesterol 07/17/2019 190  <200 mg/dL Final    Triglycerides 07/17/2019 339 (A) 30 - 200 mg/dL Final    HDL 07/17/2019 25 (A) 32 - 72 mg/dL Final    MAJOR RISK FACTOR FOR CHD      NEGATIVE RISK FOR CHD      HDL <35 mg/dL                HDL >60 mg/dL    LDL Cholesterol 07/17/2019 98.0  <130 mg/dL Final    VLDL 07/17/2019 67 (A) 0 - 40 mg/dL Final    HDL/Cholesterol Ratio 07/17/2019 7  Ratio Final    Comment: Cholesterol to HDL Ratio    Risk    Ratio-Men    Ratio-Women                            Lowest  <3.8         <2.9                            Low     3.9 - 4.7    3.0 - 3.6                            Moderate 4.8 - 5.9   3.7 - 4.6                              High     6.0 - 6.9   4.7 - 5.6                            Highest  >7           >5.7          No results found in the last 30 days.     All diagnostic data (labs/imaging) was reviewed with the patient and/or family member in the room.  All questions were answered to their liking. The patient and/or family member voiced understanding of all instructions provided. Expectations regarding follow up and treatment plan were voiced and confirmed prior to departure. The patient was given orders/instructions at the end of the visit for reference. They were instructed to notify my office if they have not been contacted for imaging/referrals/labs/results in 1-2 weeks. They voiced understanding of all of the above.     Follow up:     There are no Patient Instructions on file for this visit.     Follow up in about 6 months (around 1/11/2023), or if symptoms worsen or fail to improve.

## 2022-07-11 NOTE — ASSESSMENT & PLAN NOTE
Unfortunately we have not received the labs from January.  Will request records from Dr. Miranda.

## 2022-08-18 ENCOUNTER — PATIENT OUTREACH (OUTPATIENT)
Dept: ADMINISTRATIVE | Facility: HOSPITAL | Age: 72
End: 2022-08-18
Payer: MEDICARE

## 2022-08-18 NOTE — PROGRESS NOTES
LC Colon non -compliant: No Colonoscopy result found in [place], [employer] and [facility] Everywhere or media.

## 2023-02-15 ENCOUNTER — OFFICE VISIT (OUTPATIENT)
Dept: FAMILY MEDICINE | Facility: CLINIC | Age: 73
End: 2023-02-15
Payer: MEDICARE

## 2023-02-15 VITALS — BODY MASS INDEX: 29.85 KG/M2 | WEIGHT: 214 LBS | DIASTOLIC BLOOD PRESSURE: 78 MMHG | SYSTOLIC BLOOD PRESSURE: 133 MMHG

## 2023-02-15 DIAGNOSIS — C61 ADENOCARCINOMA OF PROSTATE: ICD-10-CM

## 2023-02-15 DIAGNOSIS — E78.5 HYPERLIPIDEMIA, UNSPECIFIED HYPERLIPIDEMIA TYPE: Primary | ICD-10-CM

## 2023-02-15 DIAGNOSIS — Z79.899 LONG TERM CURRENT USE OF THERAPEUTIC DRUG: ICD-10-CM

## 2023-02-15 DIAGNOSIS — N52.9 ERECTILE DYSFUNCTION, UNSPECIFIED ERECTILE DYSFUNCTION TYPE: ICD-10-CM

## 2023-02-15 PROCEDURE — 99214 PR OFFICE/OUTPT VISIT, EST, LEVL IV, 30-39 MIN: ICD-10-PCS | Mod: S$GLB,,, | Performed by: NURSE PRACTITIONER

## 2023-02-15 PROCEDURE — 99214 OFFICE O/P EST MOD 30 MIN: CPT | Mod: S$GLB,,, | Performed by: NURSE PRACTITIONER

## 2023-02-15 RX ORDER — TADALAFIL 20 MG/1
20 TABLET ORAL DAILY PRN
Qty: 30 TABLET | Refills: 11 | Status: SHIPPED | OUTPATIENT
Start: 2023-02-15 | End: 2024-02-15

## 2023-02-15 NOTE — PROGRESS NOTES
Subjective:      Patient ID: Mike Rome is a 72 y.o. male.    Chief Complaint: 6mn      71-year-old male with a history of hyperlipidemia, ED, rheumatoid factor positive, adenocarcinoma prostate, macrocytic anemia with vitamin B12 deficiency, prediabetes, cholelithiasis, chronic right flank pain, colon polyp     Here for 6 month follow up. He is no longer on medication at this time. No longer on vitamins. He feels well overall and feels he does not need them.       He is followed by Urology, / [facility].  PSA initially 0.05 post radiation. Currently 0.2.  He was supposed to have a PET scan a month ago but can not get a hold of Dr. Miranda to order it.   Denies dysuria, hematuria, quency, dribbling.       UTD on all preventative measures.       Only complaint it ED>        Past Medical History:   Diagnosis Date    Abnormal prostate specific antigen (PSA)     Cholelithiasis without obstruction     Erectile dysfunction     Gallstone     Hyperglycemia     Hyperlipidemia     Polyp of colon     Prediabetes     Prolapsed lumbosacral intervertebral disc     Rheumatoid factor positive     Shoulder joint pain     rt      Social History     Socioeconomic History    Marital status:    Tobacco Use    Smoking status: Every Day     Packs/day: 1.00     Years: 50.00     Pack years: 50.00     Types: Cigarettes    Smokeless tobacco: Never   Substance and Sexual Activity    Alcohol use: Yes    Drug use: Never      History reviewed. No pertinent family history.     ROS:   Review of Systems   Constitutional: Negative.    Respiratory: Negative.     Cardiovascular: Negative.    Gastrointestinal: Negative.    Genitourinary: Negative.    Musculoskeletal:  Positive for back pain (R flank (chronic)).   Skin: Negative.    Neurological: Negative.    Hematological: Negative.    Objective:   Physical Exam  Vitals and nursing note reviewed.   Constitutional:       General: He is not in acute distress.     Appearance: Normal appearance. He  is well-developed. He is not ill-appearing.   HENT:      Head: Normocephalic and atraumatic.      Nose: Nose normal.      Mouth/Throat:      Mouth: Mucous membranes are moist.   Eyes:      General: No scleral icterus.     Pupils: Pupils are equal, round, and reactive to light.   Neck:      Vascular: No carotid bruit.   Cardiovascular:      Rate and Rhythm: Normal rate and regular rhythm.      Heart sounds: Normal heart sounds. No murmur heard.    No friction rub. No gallop.   Pulmonary:      Effort: Pulmonary effort is normal.      Breath sounds: Normal breath sounds. No wheezing, rhonchi or rales.   Abdominal:      General: Abdomen is flat.   Musculoskeletal:      Cervical back: Normal range of motion.      Right lower leg: No edema.      Left lower leg: No edema.   Skin:     General: Skin is warm and dry.      Findings: No rash.   Neurological:      Mental Status: He is alert and oriented to person, place, and time. Mental status is at baseline.   Psychiatric:         Mood and Affect: Mood normal.         Behavior: Behavior normal.         Thought Content: Thought content normal.         Judgment: Judgment normal.     Assessment:     1. Hyperlipidemia, unspecified hyperlipidemia type    2. Adenocarcinoma of prostate    3. Erectile dysfunction, unspecified erectile dysfunction type    4. Long term current use of therapeutic drug      No images are attached to the encounter.   Plan:     Problem List Items Addressed This Visit          Cardiac/Vascular    Hyperlipidemia - Primary    Relevant Orders    Hemoglobin A1C    Lipid Panel    TSH+Free T4       Renal/    Erectile dysfunction    Relevant Medications    tadalafiL (CIALIS) 20 MG Tab    Other Relevant Orders    Hemoglobin A1C    Lipid Panel    TSH+Free T4       Oncology    Adenocarcinoma of prostate    Overview     T2 high volume [place] 4+3 equals 7 adenocarcinoma.     Followed by Dr. Miranda.          Current Assessment & Plan     Followed by Dr. Miranda           Relevant Orders    Hemoglobin A1C    Lipid Panel    TSH+Free T4     Other Visit Diagnoses       Long term current use of therapeutic drug        Relevant Orders    Hemoglobin A1C    Lipid Panel    TSH+Free T4          Procedures     No visits with results within 1 Month(s) from this visit.   Latest known visit with results is:   Office Visit on 07/16/2019   Component Date Value Ref Range Status    Cholesterol 07/17/2019 190  <200 mg/dL Final    Triglycerides 07/17/2019 339 (H)  30 - 200 mg/dL Final    HDL 07/17/2019 25 (L)  32 - 72 mg/dL Final    MAJOR RISK FACTOR FOR CHD      NEGATIVE RISK FOR CHD      HDL <35 mg/dL                HDL >60 mg/dL    LDL Cholesterol 07/17/2019 98.0  <130 mg/dL Final    VLDL 07/17/2019 67 (H)  0 - 40 mg/dL Final    HDL/Cholesterol Ratio 07/17/2019 7  Ratio Final    Comment: Cholesterol to HDL Ratio    Risk    Ratio-Men    Ratio-Women                            Lowest  <3.8         <2.9                            Low     3.9 - 4.7    3.0 - 3.6                            Moderate 4.8 - 5.9   3.7 - 4.6                              High     6.0 - 6.9   4.7 - 5.6                            Highest  >7           >5.7          No results found in the last 30 days.     All diagnostic data (labs/imaging) was reviewed with the patient and/or family member in the room.  All questions were answered to their liking. The patient and/or family member voiced understanding of all instructions provided. Expectations regarding follow up and treatment plan were voiced and confirmed prior to departure. The patient was given orders/instructions at the end of the visit for reference. They were instructed to notify my office if they have not been contacted for imaging/referrals/labs/results in 1-2 weeks. They voiced understanding of all of the above.     Follow up:     There are no Patient Instructions on file for this visit.     Follow up in about 6 months (around 8/15/2023), or if symptoms worsen or fail  to improve.

## 2023-08-15 ENCOUNTER — OFFICE VISIT (OUTPATIENT)
Dept: FAMILY MEDICINE | Facility: CLINIC | Age: 73
End: 2023-08-15
Payer: MEDICARE

## 2023-08-15 VITALS
OXYGEN SATURATION: 97 % | HEIGHT: 71 IN | HEART RATE: 62 BPM | WEIGHT: 210 LBS | BODY MASS INDEX: 29.4 KG/M2 | SYSTOLIC BLOOD PRESSURE: 133 MMHG | DIASTOLIC BLOOD PRESSURE: 71 MMHG

## 2023-08-15 DIAGNOSIS — H65.03 NON-RECURRENT ACUTE SEROUS OTITIS MEDIA OF BOTH EARS: ICD-10-CM

## 2023-08-15 DIAGNOSIS — D36.9 DERMOID CYST: Primary | ICD-10-CM

## 2023-08-15 DIAGNOSIS — Z09 FOLLOW-UP EXAM, 3-6 MONTHS SINCE PREVIOUS EXAM: ICD-10-CM

## 2023-08-15 PROCEDURE — 96372 THER/PROPH/DIAG INJ SC/IM: CPT | Mod: S$GLB,,, | Performed by: NURSE PRACTITIONER

## 2023-08-15 PROCEDURE — 96372 PR INJECTION,THERAP/PROPH/DIAG2ST, IM OR SUBCUT: ICD-10-PCS | Mod: S$GLB,,, | Performed by: NURSE PRACTITIONER

## 2023-08-15 PROCEDURE — 99213 OFFICE O/P EST LOW 20 MIN: CPT | Mod: 25,S$GLB,, | Performed by: NURSE PRACTITIONER

## 2023-08-15 PROCEDURE — 99213 PR OFFICE/OUTPT VISIT, EST, LEVL III, 20-29 MIN: ICD-10-PCS | Mod: 25,S$GLB,, | Performed by: NURSE PRACTITIONER

## 2023-08-15 RX ORDER — PREDNISONE 20 MG/1
20 TABLET ORAL DAILY
Qty: 3 TABLET | Refills: 0 | Status: SHIPPED | OUTPATIENT
Start: 2023-08-15 | End: 2023-08-18

## 2023-08-15 RX ORDER — PREDNISONE 20 MG/1
20 TABLET ORAL DAILY
Qty: 3 TABLET | Refills: 0 | Status: SHIPPED | OUTPATIENT
Start: 2023-08-15 | End: 2023-08-15

## 2023-08-15 NOTE — PROGRESS NOTES
Subjective:      Patient ID: Mike Rome is a 72 y.o. male.    Chief Complaint: Follow-up (6 month follow up, denies any complaints. He had a cyst on his upper back  lanced at Urgent care last month, they were unable to remove the sac.  He has completed the antibiotics. )      71-year-old male with a history of hyperlipidemia, ED, rheumatoid factor positive, adenocarcinoma prostate, macrocytic anemia with vitamin B12 deficiency, prediabetes, cholelithiasis, chronic right flank pain, colon polyp     Here for 6 month follow up. He is no longer on medication at this time. No longer on vitamins. He feels well overall and feels he does not need them.       He is followed by Urology, / [facility].  PSA initially 0.05 post radiation. Currently 0.2.  He was supposed to have a PET scan a month ago but can not get a hold of Dr. Miranda to order it.   Denies dysuria, hematuria, quency, dribbling.       UTD on all preventative measures.       Only complaint is bilateral ear fullness.  This started after he had a upper respiratory tract infection about a month ago.  Denies ear pain.  Denies drainage.  He has been using hydrogen peroxide and ear wax removal.  Still feels fullness in both ears with the left greater than right.  Denies fever, chills, lymphadenopathy.          Past Medical History:   Diagnosis Date    Abnormal prostate specific antigen (PSA)     Cholelithiasis without obstruction     Erectile dysfunction     Gallstone     Hyperglycemia     Hyperlipidemia     Polyp of colon     Prediabetes     Prolapsed lumbosacral intervertebral disc     Rheumatoid factor positive     Shoulder joint pain     rt      Social History     Socioeconomic History    Marital status:    Tobacco Use    Smoking status: Every Day     Current packs/day: 1.00     Average packs/day: 1 pack/day for 50.0 years (50.0 ttl pk-yrs)     Types: Cigarettes    Smokeless tobacco: Never   Substance and Sexual Activity    Alcohol use: Yes    Drug use:  Never      No family history on file.     ROS:   Review of Systems   Constitutional:  Negative for appetite change, chills and fever.   HENT:  Negative for ear discharge, ear pain, hearing loss, sinus pressure, sinus pain and tinnitus.    Eyes:  Negative for visual disturbance.   Respiratory:  Negative for cough, chest tightness, shortness of breath and wheezing.    Cardiovascular:  Negative for chest pain.   Gastrointestinal:  Negative for abdominal pain, diarrhea, nausea and vomiting.   Endocrine: Negative for polydipsia, polyphagia and polyuria.   Genitourinary:  Negative for difficulty urinating, dysuria, flank pain and hematuria.   Musculoskeletal:  Negative for back pain and neck pain.   Skin:  Negative for rash.   Neurological:  Negative for dizziness, tremors and headaches.   Hematological:  Negative for adenopathy. Does not bruise/bleed easily.   Psychiatric/Behavioral:  Negative for confusion, dysphoric mood, hallucinations, sleep disturbance and suicidal ideas.      Objective:   Physical Exam  Vitals and nursing note reviewed.   Constitutional:       General: He is not in acute distress.     Appearance: Normal appearance. He is well-developed. He is not ill-appearing.   HENT:      Head: Normocephalic and atraumatic.      Right Ear: Hearing, ear canal and external ear normal. A middle ear effusion is present. Tympanic membrane is injected. Tympanic membrane is not perforated or erythematous.      Left Ear: Hearing, ear canal and external ear normal. A middle ear effusion is present. Tympanic membrane is not perforated or erythematous.      Nose: Nose normal.      Mouth/Throat:      Mouth: Mucous membranes are moist.   Eyes:      General: No scleral icterus.     Pupils: Pupils are equal, round, and reactive to light.   Neck:      Vascular: No carotid bruit.   Cardiovascular:      Rate and Rhythm: Normal rate and regular rhythm.      Heart sounds: Normal heart sounds. No murmur heard.     No friction rub. No  gallop.   Pulmonary:      Effort: Pulmonary effort is normal.      Breath sounds: Normal breath sounds. No wheezing, rhonchi or rales.   Abdominal:      General: Abdomen is flat.   Musculoskeletal:      Cervical back: Normal range of motion.      Right lower leg: No edema.      Left lower leg: No edema.   Skin:     General: Skin is warm and dry.      Findings: No rash.   Neurological:      Mental Status: He is alert and oriented to person, place, and time. Mental status is at baseline.   Psychiatric:         Mood and Affect: Mood normal.         Behavior: Behavior normal.         Thought Content: Thought content normal.         Judgment: Judgment normal.       Assessment:     1. Dermoid cyst    2. Non-recurrent acute serous otitis media of both ears    3. Follow-up exam, 3-6 months since previous exam      No images are attached to the encounter.   Plan:     Problem List Items Addressed This Visit    None  Visit Diagnoses       Dermoid cyst    -  Primary    He will follow up with dermatology in December.     Non-recurrent acute serous otitis media of both ears        Relevant Medications    cefTRIAXone (ROCEPHIN) 1 g in LIDOcaine HCL 10 mg/ml (1%) 4 mL IM only syringe (Completed)    predniSONE (DELTASONE) 20 MG tablet    Follow-up exam, 3-6 months since previous exam        Will get [facility] from [facility] and call him w/ results.           Procedures     No visits with results within 1 Month(s) from this visit.   Latest known visit with results is:   Office Visit on 07/16/2019   Component Date Value Ref Range Status    Cholesterol 07/17/2019 190  <200 mg/dL Final    Triglycerides 07/17/2019 339 (H)  30 - 200 mg/dL Final    HDL 07/17/2019 25 (L)  32 - 72 mg/dL Final    MAJOR RISK FACTOR FOR CHD      NEGATIVE RISK FOR CHD      HDL <35 mg/dL                HDL >60 mg/dL    LDL Cholesterol 07/17/2019 98.0  <130 mg/dL Final    VLDL 07/17/2019 67 (H)  0 - 40 mg/dL Final    HDL/Cholesterol Ratio 07/17/2019 7  Ratio Final     Comment: Cholesterol to HDL Ratio    Risk    Ratio-Men    Ratio-Women                            Lowest  <3.8         <2.9                            Low     3.9 - 4.7    3.0 - 3.6                            Moderate 4.8 - 5.9   3.7 - 4.6                              High     6.0 - 6.9   4.7 - 5.6                            Highest  >7           >5.7          No results found in the last 30 days.       Duration of encounter:  minutes  This includes face-to-face time and non face-to-face time preparing to see the patient (eg, review of tests), obtaining and/or reviewing separately obtained history, documenting clinical information in the electronic or other health record, independently interpreting resultsand communicating results to the patient/family/caregiver, or care coordination    All diagnostic data (labs/imaging) was reviewed with the patient and/or family member in the room.  All questions were answered to their liking. The patient and/or family member voiced understanding of all instructions provided. Expectations regarding follow up and treatment plan were voiced and confirmed prior to departure. The patient was given orders/instructions at the end of the visit for reference. They were instructed to notify my office if they have not been contacted for imaging/referrals/labs/results in 1-2 weeks. They voiced understanding of all of the above.     Follow up:     There are no Patient Instructions on file for this visit.     No follow-ups on file.

## 2024-02-19 ENCOUNTER — OFFICE VISIT (OUTPATIENT)
Dept: FAMILY MEDICINE | Facility: CLINIC | Age: 74
End: 2024-02-19
Payer: MEDICARE

## 2024-02-19 VITALS
WEIGHT: 223 LBS | SYSTOLIC BLOOD PRESSURE: 133 MMHG | OXYGEN SATURATION: 98 % | DIASTOLIC BLOOD PRESSURE: 70 MMHG | BODY MASS INDEX: 31.1 KG/M2 | HEART RATE: 71 BPM

## 2024-02-19 DIAGNOSIS — G72.0 STATIN MYOPATHY: ICD-10-CM

## 2024-02-19 DIAGNOSIS — T46.6X5A STATIN MYOPATHY: ICD-10-CM

## 2024-02-19 DIAGNOSIS — C61 ADENOCARCINOMA OF PROSTATE: ICD-10-CM

## 2024-02-19 DIAGNOSIS — E78.5 HYPERLIPIDEMIA, UNSPECIFIED HYPERLIPIDEMIA TYPE: ICD-10-CM

## 2024-02-19 DIAGNOSIS — Z79.899 LONG TERM USE OF DRUG: ICD-10-CM

## 2024-02-19 DIAGNOSIS — Z00.00 PREVENTATIVE HEALTH CARE: Primary | ICD-10-CM

## 2024-02-19 DIAGNOSIS — R63.5 WEIGHT GAIN: ICD-10-CM

## 2024-02-19 PROCEDURE — 99397 PER PM REEVAL EST PAT 65+ YR: CPT | Mod: GZ,S$GLB,, | Performed by: NURSE PRACTITIONER

## 2024-02-19 NOTE — PROGRESS NOTES
Subjective:      Patient ID: Mike Rome is a 73 y.o. male.    Chief Complaint: Follow-up      73-year-old male with a history of hyperlipidemia, ED, rheumatoid factor positive, adenocarcinoma prostate, macrocytic anemia with vitamin B12 deficiency, prediabetes, cholelithiasis, chronic right flank pain, colon polyp     Here for AWV. He is no longer on medication at this time. No longer on vitamins. He feels well overall and feels he does not need them.         He is followed by Urology, / Ruben.  PSA initially 0.05 post radiation. Currently going up.   Recent PET scan negative. Bone scan negative as well. Has upcoming CT abd/pelvis.   Denies dysuria, hematuria, quency, dribbling.         UTD on all preventative measures. Colonoscopy due this year. Refuses all vaccinations.               Past Medical History:   Diagnosis Date    Abnormal prostate specific antigen (PSA)     Cholelithiasis without obstruction     Erectile dysfunction     Gallstone     Hyperglycemia     Hyperlipidemia     Polyp of colon     Prediabetes     Prolapsed lumbosacral intervertebral disc     Rheumatoid factor positive     Shoulder joint pain     rt      Social History     Socioeconomic History    Marital status:    Tobacco Use    Smoking status: Every Day     Current packs/day: 1.00     Average packs/day: 1 pack/day for 50.0 years (50.0 ttl pk-yrs)     Types: Cigarettes    Smokeless tobacco: Never   Substance and Sexual Activity    Alcohol use: Yes    Drug use: Never      History reviewed. No pertinent family history.     ROS:   Review of Systems   Constitutional:  Negative for appetite change, chills and fever.   HENT:  Negative for ear discharge, ear pain, hearing loss, sinus pressure, sinus pain and tinnitus.    Eyes:  Negative for visual disturbance.   Respiratory:  Negative for cough, chest tightness, shortness of breath and wheezing.    Cardiovascular:  Negative for chest pain.   Gastrointestinal:  Negative for abdominal  pain, diarrhea, nausea and vomiting.   Endocrine: Negative for polydipsia, polyphagia and polyuria.   Genitourinary:  Negative for difficulty urinating, dysuria, flank pain and hematuria.   Musculoskeletal:  Negative for back pain and neck pain.   Skin:  Negative for rash.   Neurological:  Negative for dizziness, tremors and headaches.   Hematological:  Negative for adenopathy. Does not bruise/bleed easily.   Psychiatric/Behavioral:  Negative for confusion, dysphoric mood, hallucinations, sleep disturbance and suicidal ideas.      Objective:   Physical Exam  Vitals and nursing note reviewed.   Constitutional:       General: He is not in acute distress.     Appearance: Normal appearance. He is well-developed. He is not ill-appearing.   HENT:      Head: Normocephalic and atraumatic.      Right Ear: External ear normal.      Left Ear: External ear normal.      Nose: Nose normal.      Mouth/Throat:      Mouth: Mucous membranes are moist.   Eyes:      General: No scleral icterus.     Pupils: Pupils are equal, round, and reactive to light.   Neck:      Vascular: No carotid bruit.   Cardiovascular:      Rate and Rhythm: Normal rate and regular rhythm.      Pulses: Normal pulses.      Heart sounds: Normal heart sounds. No murmur heard.     No friction rub. No gallop.   Pulmonary:      Effort: Pulmonary effort is normal.      Breath sounds: Normal breath sounds. No wheezing, rhonchi or rales.   Abdominal:      General: Abdomen is flat.   Musculoskeletal:      Cervical back: Normal range of motion.      Right lower leg: No edema.      Left lower leg: No edema.   Skin:     General: Skin is warm and dry.      Findings: No rash.   Neurological:      Mental Status: He is alert and oriented to person, place, and time. Mental status is at baseline.   Psychiatric:         Mood and Affect: Mood normal.         Behavior: Behavior normal.         Thought Content: Thought content normal.         Judgment: Judgment normal.        Assessment:     1. Preventative health care    2. Adenocarcinoma of prostate    3. Long term use of drug    4. Hyperlipidemia, unspecified hyperlipidemia type    5. Weight gain    6. Statin myopathy      No images are attached to the encounter.   Plan:     Problem List Items Addressed This Visit          Cardiac/Vascular    Hyperlipidemia    Relevant Orders    TSH+Free T4    Lipid Panel    Hemoglobin A1C    Comprehensive Metabolic Panel    CBC Auto Differential       Oncology    Adenocarcinoma of prostate    Overview     T2 high volume [place] 4+3 equals 7 adenocarcinoma.     Followed by Dr. Miranda.          Current Assessment & Plan     Followed by Dr. Miranda.  PET Scan negative. Bone scan negative.  Has CT abd/pelvis upcoming.          Relevant Orders    TSH+Free T4    Lipid Panel    Hemoglobin A1C    Comprehensive Metabolic Panel    CBC Auto Differential     Other Visit Diagnoses       Preventative health care    -  Primary    Has CT Chest/abd/pelvis upcoming. no need for LDCT or AAA screening.    Relevant Orders    TSH+Free T4    Lipid Panel    Hemoglobin A1C    Comprehensive Metabolic Panel    CBC Auto Differential    Long term use of drug        Relevant Orders    TSH+Free T4    Lipid Panel    Hemoglobin A1C    Comprehensive Metabolic Panel    CBC Auto Differential    Weight gain        Relevant Orders    TSH+Free T4    Lipid Panel    Hemoglobin A1C    Comprehensive Metabolic Panel    CBC Auto Differential    Statin myopathy              Procedures     No visits with results within 1 Month(s) from this visit.   Latest known visit with results is:   Office Visit on 07/16/2019   Component Date Value Ref Range Status    Cholesterol 07/17/2019 190  <200 mg/dL Final    Triglycerides 07/17/2019 339 (H)  30 - 200 mg/dL Final    HDL 07/17/2019 25 (L)  32 - 72 mg/dL Final    MAJOR RISK FACTOR FOR CHD      NEGATIVE RISK FOR CHD      HDL <35 mg/dL                HDL >60 mg/dL    LDL Cholesterol 07/17/2019 98.0   <130 mg/dL Final    VLDL 07/17/2019 67 (H)  0 - 40 mg/dL Final    HDL/Cholesterol Ratio 07/17/2019 7  Ratio Final    Comment: Cholesterol to HDL Ratio    Risk    Ratio-Men    Ratio-Women                            Lowest  <3.8         <2.9                            Low     3.9 - 4.7    3.0 - 3.6                            Moderate 4.8 - 5.9   3.7 - 4.6                              High     6.0 - 6.9   4.7 - 5.6                            Highest  >7           >5.7          No results found in the last 30 days.       Duration of encounter:  minutes  This includes face-to-face time and non face-to-face time preparing to see the patient (eg, review of tests), obtaining and/or reviewing separately obtained history, documenting clinical information in the electronic or other health record, independently interpreting resultsand communicating results to the patient/family/caregiver, or care coordination      DISCLAIMER: This note was prepared with [employer] voice recognition transcription software. Garbled syntax, mangled pronouns, and other bizarre constructions may be attributed to that software system.     All diagnostic data (labs/imaging) was reviewed with the patient and/or family member in the room.  All questions were answered to their liking. The patient and/or family member voiced understanding of all instructions provided. Expectations regarding follow up and treatment plan were voiced and confirmed prior to departure. The patient was given orders/instructions at the end of the visit for reference. They were instructed to notify my office if they have not been contacted for imaging/referrals/labs/results in 1-2 weeks. They voiced understanding of all of the above.     Follow up:     There are no Patient Instructions on file for this visit.     Follow up in about 6 months (around 8/19/2024), or if symptoms worsen or fail to improve.

## 2024-03-07 ENCOUNTER — OFFICE VISIT (OUTPATIENT)
Dept: FAMILY MEDICINE | Facility: CLINIC | Age: 74
End: 2024-03-07
Payer: MEDICARE

## 2024-03-07 ENCOUNTER — TELEPHONE (OUTPATIENT)
Dept: FAMILY MEDICINE | Facility: CLINIC | Age: 74
End: 2024-03-07

## 2024-03-07 DIAGNOSIS — M19.042 ARTHRITIS OF BOTH HANDS: ICD-10-CM

## 2024-03-07 DIAGNOSIS — M79.89 BILATERAL HAND SWELLING: ICD-10-CM

## 2024-03-07 DIAGNOSIS — M13.0 POLYARTHROPATHY: ICD-10-CM

## 2024-03-07 DIAGNOSIS — R76.8 RHEUMATOID FACTOR POSITIVE: Primary | ICD-10-CM

## 2024-03-07 DIAGNOSIS — M19.041 ARTHRITIS OF BOTH HANDS: ICD-10-CM

## 2024-03-07 LAB — URATE SERPL-MCNC: 4.9 MG/DL (ref 3.4–7)

## 2024-03-07 PROCEDURE — 99214 OFFICE O/P EST MOD 30 MIN: CPT | Mod: 25,S$GLB,, | Performed by: NURSE PRACTITIONER

## 2024-03-07 PROCEDURE — 96372 THER/PROPH/DIAG INJ SC/IM: CPT | Mod: S$GLB,,, | Performed by: NURSE PRACTITIONER

## 2024-03-07 RX ORDER — TRAMADOL HYDROCHLORIDE 50 MG/1
50 TABLET ORAL EVERY 6 HOURS PRN
Qty: 30 TABLET | Refills: 0 | Status: SHIPPED | OUTPATIENT
Start: 2024-03-07

## 2024-03-07 NOTE — PROGRESS NOTES
Subjective:      Patient ID: Mike Rome is a 73 y.o. male.    Chief Complaint: No chief complaint on file.      73-year-old male with a history of hyperlipidemia, ED, rheumatoid factor positive, adenocarcinoma prostate, macrocytic anemia with vitamin B12 deficiency, prediabetes, cholelithiasis, chronic right flank pain, colon polyp      The patient is here today with complaints of bilateral hand pain, swelling, and limited range of motion/gripping.  This started approximately 2 weeks ago.  Tells me that he is unable to  his  due to pain.  The swelling is predominantly in the knuckles.  He reports pain there as well.  No recent injury.  He does have a positive rheumatoid factor and has not been on treatment.  He does not recall ever seeing a rheumatologist in the past.  We have on record that he saw Dr. Velazquez.  He was offered 1 year follow-up since he was asymptomatic at the time.  He refused stating he was not having any symptoms.  Denies fever, chills, body aches, rash, lymphadenopathy.        Past Medical History:   Diagnosis Date    Abnormal prostate specific antigen (PSA)     Cholelithiasis without obstruction     Erectile dysfunction     Gallstone     Hyperglycemia     Hyperlipidemia     Polyp of colon     Prediabetes     Prolapsed lumbosacral intervertebral disc     Rheumatoid factor positive     Shoulder joint pain     rt      Social History     Socioeconomic History    Marital status:    Tobacco Use    Smoking status: Every Day     Current packs/day: 1.00     Average packs/day: 1 pack/day for 50.0 years (50.0 ttl pk-yrs)     Types: Cigarettes    Smokeless tobacco: Never   Substance and Sexual Activity    Alcohol use: Yes    Drug use: Never      History reviewed. No pertinent family history.     ROS:   Review of Systems   Constitutional:  Negative for appetite change, chills and fever.   HENT:  Negative for ear discharge, ear pain, hearing loss, sinus pressure, sinus pain and tinnitus.     Eyes:  Negative for visual disturbance.   Respiratory:  Negative for cough, chest tightness, shortness of breath and wheezing.    Cardiovascular:  Negative for chest pain.   Gastrointestinal:  Negative for abdominal pain, diarrhea, nausea and vomiting.   Endocrine: Negative for polydipsia, polyphagia and polyuria.   Genitourinary:  Negative for difficulty urinating, dysuria, flank pain and hematuria.   Musculoskeletal:  Positive for arthralgias and joint swelling. Negative for back pain and neck pain.   Skin:  Negative for rash.   Neurological:  Negative for dizziness, tremors and headaches.   Hematological:  Negative for adenopathy. Does not bruise/bleed easily.   Psychiatric/Behavioral:  Negative for confusion, dysphoric mood, hallucinations, sleep disturbance and suicidal ideas.      Objective:   Physical Exam  Vitals and nursing note reviewed.   Constitutional:       General: He is not in acute distress.     Appearance: Normal appearance. He is well-developed. He is not ill-appearing.   HENT:      Head: Normocephalic and atraumatic.   Eyes:      General: No scleral icterus.     Pupils: Pupils are equal, round, and reactive to light.   Neck:      Vascular: No carotid bruit.   Cardiovascular:      Rate and Rhythm: Normal rate.      Pulses: Normal pulses.   Pulmonary:      Effort: Pulmonary effort is normal.   Abdominal:      General: Abdomen is flat.   Musculoskeletal:      Right wrist: Normal.      Left wrist: Normal.      Right hand: Swelling present. No deformity or bony tenderness. Decreased range of motion. Decreased strength. Normal sensation. There is no disruption of two-point discrimination. Normal capillary refill. Normal pulse.      Left hand: Swelling present. No deformity or bony tenderness. Decreased range of motion. Decreased strength. Normal sensation. There is no disruption of two-point discrimination. Normal capillary refill. Normal pulse.        Hands:       Cervical back: Normal range of  motion.      Right lower leg: No edema.      Left lower leg: No edema.   Skin:     General: Skin is warm and dry.      Findings: No rash.   Neurological:      Mental Status: He is alert and oriented to person, place, and time. Mental status is at baseline.   Psychiatric:         Mood and Affect: Mood normal.         Behavior: Behavior normal.         Thought Content: Thought content normal.         Judgment: Judgment normal.       Assessment:     1. Rheumatoid factor positive    2. Polyarthropathy    3. Bilateral hand swelling    4. Arthritis of both hands      No images are attached to the encounter.   Plan:     Problem List Items Addressed This Visit          Immunology/Multi System    Rheumatoid factor positive - Primary    Overview     Followed by Dr. Velazquez.     Was offered to come back in 1 year. PT deferred 2/t asymptomatic.          Current Assessment & Plan       The patient does not recall seeing Dr. Velazquez around 2019.      PLAN    Given his current bilateral hand inflammation/arthritis, I recommend an AVISE CTD test.  Will get inflammatory workup then call him with the results.  We will likely refer him to a new rheumatologist once we get results back      Treat current inflammatory state with Solumedrol IM followed by OTC Ibuprofen. Advised Prilosec 20 mg bid x 14 days.          Relevant Orders    CBC Auto Differential    Comprehensive Metabolic Panel    Sedimentation rate    C-Reactive Protein    Misc Sendout Test, Blood AVISE CTD    Immunoglobulins (IgG, IgA, IgM) Quantitative    Immunofixation Electrophoresis    IgE    Protein Electrophoresis, Serum    Uric Acid (Completed)     Other Visit Diagnoses       Polyarthropathy        Relevant Medications    methylPREDNISolone sod suc(PF) injection 125 mg (Completed)    traMADoL (ULTRAM) 50 mg tablet    Other Relevant Orders    CBC Auto Differential    Comprehensive Metabolic Panel    Sedimentation rate    C-Reactive Protein    Misc Sendout Test, Blood AVISE  CTD    Immunoglobulins (IgG, IgA, IgM) Quantitative    Immunofixation Electrophoresis    IgE    Protein Electrophoresis, Serum    Uric Acid (Completed)    Bilateral hand swelling        Relevant Medications    methylPREDNISolone sod suc(PF) injection 125 mg (Completed)    traMADoL (ULTRAM) 50 mg tablet    Other Relevant Orders    CBC Auto Differential    Comprehensive Metabolic Panel    Sedimentation rate    C-Reactive Protein    Misc Sendout Test, Blood AVISE CTD    Immunoglobulins (IgG, IgA, IgM) Quantitative    Immunofixation Electrophoresis    IgE    Protein Electrophoresis, Serum    Uric Acid (Completed)    Arthritis of both hands        Relevant Medications    methylPREDNISolone sod suc(PF) injection 125 mg (Completed)    traMADoL (ULTRAM) 50 mg tablet    Other Relevant Orders    CBC Auto Differential    Comprehensive Metabolic Panel    Sedimentation rate    C-Reactive Protein    Misc Sendout Test, Blood AVISE CTD    Immunoglobulins (IgG, IgA, IgM) Quantitative    Immunofixation Electrophoresis    IgE    Protein Electrophoresis, Serum    Uric Acid (Completed)          Procedures     Office Visit on 03/07/2024   Component Date Value Ref Range Status    Uric Acid 03/07/2024 4.9  3.4 - 7.0 mg/dL Final    Comment: NOTE  Testing performed at:  [facility], [address] [place] [address] CLIA #:[id number]          No results found in the last 30 days.       Duration of encounter:  minutes  This includes face-to-face time and non face-to-face time preparing to see the patient (eg, review of tests), obtaining and/or reviewing separately obtained history, documenting clinical information in the electronic or other health record, independently interpreting resultsand communicating results to the patient/family/caregiver, or care coordination      DISCLAIMER: This note was prepared with [employer] voice recognition transcription software. Garbled syntax, mangled pronouns, and other bizarre constructions  may be attributed to that software system.     All diagnostic data (labs/imaging) was reviewed with the patient and/or family member in the room.  All questions were answered to their liking. The patient and/or family member voiced understanding of all instructions provided. Expectations regarding follow up and treatment plan were voiced and confirmed prior to departure. The patient was given orders/instructions at the end of the visit for reference. They were instructed to notify my office if they have not been contacted for imaging/referrals/labs/results in 1-2 weeks. They voiced understanding of all of the above.     Follow up:     There are no Patient Instructions on file for this visit.     Follow up in about 6 months (around 9/7/2024), or if symptoms worsen or fail to improve.

## 2024-03-07 NOTE — TELEPHONE ENCOUNTER
Patient's daughter called status of script of Tramadol it was sent to pharmacy. Informed patient's daughter that the script was sent to [facility] in [place] at 1017 this morning. Daughter stated understanding and she stated that she is going to  the script from [facility].

## 2024-03-07 NOTE — ASSESSMENT & PLAN NOTE
The patient does not recall seeing Dr. Velazquez around 2019.      PLAN    Given his current bilateral hand inflammation/arthritis, I recommend an AVISE CTD test.  Will get inflammatory workup then call him with the results.  We will likely refer him to a new rheumatologist once we get results back      Treat current inflammatory state with Solumedrol IM followed by OTC Ibuprofen. Advised Prilosec 20 mg bid x 14 days.

## 2024-03-11 LAB
ABS NRBC COUNT: 0 X 10 3/UL (ref 0–0.01)
ABSOLUTE BASOPHIL: 0 X 10 3/UL (ref 0–0.22)
ABSOLUTE EOSINOPHIL: 0.06 X 10 3/UL (ref 0.04–0.54)
ABSOLUTE LYMPHOCYTE: 0.56 X 10 3/UL (ref 0.86–4.75)
ABSOLUTE MONOCYTE: 0.04 X 10 3/UL (ref 0.22–1.08)
ADDITIONAL TESTING: NORMAL
ALBUMIN SERPL-MCNC: 3.7 G/DL (ref 3.5–5.2)
ALBUMIN, ELECTROPHORESIS: 3.21 G/DL (ref 3.2–5.3)
ALBUMIN/GLOB SERPL ELPH: 0.92 G/DL (ref 1–2.7)
ALBUMIN/GLOB SERPL ELPH: 1.1 {RATIO} (ref 1–2.7)
ALP ISOS SERPL LEV INH-CCNC: 87 U/L (ref 40–130)
ALPHA 1: 0.32 G/DL (ref 0.1–0.4)
ALPHA 2: 0.81 G/DL (ref 0.6–1)
ALT (SGPT): 19 U/L (ref 0–41)
ANION GAP SERPL CALC-SCNC: 10 MMOL/L (ref 8–17)
AST SERPL-CCNC: 23 U/L (ref 0–40)
BANDS: 1 % (ref 1–5)
BETA: 1.17 G/DL (ref 0.6–1.3)
BILIRUBIN, TOTAL: 0.48 MG/DL (ref 0–1.2)
BUN/CREAT SERPL: 21.6 (ref 6–20)
CALCIUM SERPL-MCNC: 8.8 MG/DL (ref 8.6–10.2)
CARBON DIOXIDE, CO2: 26 MMOL/L (ref 22–29)
CHLORIDE: 106 MMOL/L (ref 98–107)
CREAT SERPL-MCNC: 0.79 MG/DL (ref 0.7–1.2)
CRP QUALITATIVE: POSITIVE MG/L
CRP QUANTITATIVE: 17.4 MG/L
EOSINOPHIL NFR BLD: 3 % (ref 1–7)
GAMMA: 1.18 G/DL (ref 0.7–1.5)
GFR ESTIMATION: 92.64 ML/MIN/1.73M2
GLOBULIN: 3.5 G/DL (ref 1.5–4.5)
GLUCOSE: 105 MG/DL (ref 82–115)
HCT VFR BLD AUTO: 42.6 % (ref 42–52)
HGB BLD-MCNC: 14.2 G/DL (ref 14–18)
IFE PARAPROTEIN, CSF: DETECTED
IGA SERPL-MCNC: 387 MG/DL (ref 70–400)
IGE: 377 IU/ML (ref 0–100)
IGG SERPL-MCNC: 1115 MG/DL (ref 700–1600)
IGM: 54 MG/DL (ref 40–230)
INTERPRETATION UR IFE-IMP: ABNORMAL
LYMPHOCYTES NFR BLD: 26 % (ref 19–53)
Lab: 0.24 G/DL
MCH RBC QN AUTO: 32.9 PG (ref 27–32)
MCHC RBC AUTO-ENTMCNC: 33.3 G/DL (ref 32–36)
MCV RBC AUTO: 98.6 FL (ref 80–94)
MONOCYTES NFR BLD: 2 % (ref 5–13)
NEUTROPHILS # BLD AUTO: 1.49 X 10 3/UL (ref 2.32–6.7)
NUCLEATED RED BLOOD CELLS: 0 /100 WBC (ref 0–0.2)
PEPSIN A INTERPRETATION: ABNORMAL
PLATELET # BLD AUTO: 143 X 10 3/UL (ref 135–400)
POTASSIUM: 4.5 MMOL/L (ref 3.5–5.1)
PROT SNV-MCNC: 7.2 G/DL (ref 6.4–8.3)
RBC # BLD AUTO: 4.32 X 10 6/UL (ref 4.7–6.1)
RBC & PLT MORPHOLOGY: ABNORMAL
RDW-SD: 50.9 FL (ref 37–54)
SED RATE (WESTERGREN): 28 MM/HR (ref 0–20)
SEGMENTERS: 68 % (ref 34–71)
SODIUM: 142 MMOL/L (ref 136–145)
TOTAL GRANULOCYTES: 1.56 X 10 3/UL
UREA NITROGEN (BUN): 17.1 MG/DL (ref 8–23)
WBC # BLD: 2.16 X 10 3/UL (ref 4.3–10.8)

## 2024-03-12 PROBLEM — D47.2 IGG MONOCLONAL GAMMOPATHY OF UNCERTAIN SIGNIFICANCE: Status: ACTIVE | Noted: 2024-03-12

## 2024-03-12 PROBLEM — M05.742 RHEUMATOID ARTHRITIS INVOLVING BOTH HANDS WITH POSITIVE RHEUMATOID FACTOR: Status: ACTIVE | Noted: 2024-03-12

## 2024-03-12 PROBLEM — M05.741 RHEUMATOID ARTHRITIS INVOLVING BOTH HANDS WITH POSITIVE RHEUMATOID FACTOR: Status: ACTIVE | Noted: 2024-03-12

## 2024-03-14 ENCOUNTER — OFFICE VISIT (OUTPATIENT)
Dept: FAMILY MEDICINE | Facility: CLINIC | Age: 74
End: 2024-03-14
Payer: MEDICARE

## 2024-03-14 VITALS — SYSTOLIC BLOOD PRESSURE: 137 MMHG | OXYGEN SATURATION: 96 % | DIASTOLIC BLOOD PRESSURE: 72 MMHG | HEART RATE: 77 BPM

## 2024-03-14 DIAGNOSIS — M05.741 RHEUMATOID ARTHRITIS INVOLVING BOTH HANDS WITH POSITIVE RHEUMATOID FACTOR: Primary | ICD-10-CM

## 2024-03-14 DIAGNOSIS — E04.1 THYROID NODULE: ICD-10-CM

## 2024-03-14 DIAGNOSIS — D72.810 LYMPHOCYTOPENIA: ICD-10-CM

## 2024-03-14 DIAGNOSIS — D47.2 IGG MONOCLONAL GAMMOPATHY OF UNCERTAIN SIGNIFICANCE: ICD-10-CM

## 2024-03-14 DIAGNOSIS — R76.8 ELEVATED IGE LEVEL: ICD-10-CM

## 2024-03-14 DIAGNOSIS — M05.742 RHEUMATOID ARTHRITIS INVOLVING BOTH HANDS WITH POSITIVE RHEUMATOID FACTOR: Primary | ICD-10-CM

## 2024-03-14 PROCEDURE — 99214 OFFICE O/P EST MOD 30 MIN: CPT | Mod: S$GLB,,, | Performed by: NURSE PRACTITIONER

## 2024-03-14 RX ORDER — MELOXICAM 7.5 MG/1
7.5 TABLET ORAL DAILY
Qty: 30 TABLET | Refills: 3 | Status: SHIPPED | OUTPATIENT
Start: 2024-03-14

## 2024-03-14 NOTE — ASSESSMENT & PLAN NOTE
PLAN    Refer to Rheumatology.   Add meloxicam for arthritis   Continue Tramadol PRN for now.

## 2024-03-14 NOTE — PROGRESS NOTES
Subjective:      Patient ID: Mike Rome is a 73 y.o. male.    Chief Complaint: Results      73-year-old male with a history of hyperlipidemia, ED, rheumatoid factor positive, adenocarcinoma prostate, macrocytic anemia with vitamin B12 deficiency, prediabetes, cholelithiasis, chronic right flank pain, colon polyp      The patient is here today for follow up regarding complaints of bilateral hand pain, swelling, and limited range of motion/gripping.  During his last encounter, he was sent for labs prior to coming back for Solu-Medrol intramuscular injection.  He states the injection helped almost immediately.  His pain returned this morning.  He is currently taking tramadol and over-the-counter Tylenol 650 mg. He does have a positive rheumatoid factor and has not been on treatment.  He does not recall ever seeing a rheumatologist in the past.  We have on record that he saw Dr. Velazquez.  He was offered 1 year follow-up since he was asymptomatic at the time.  He refused stating he was not having any symptoms.  Denies fever, chills, body aches, rash, lymphadenopathy. AVISE CTD showed RF , RF IGA > 214, Anti- CCP IgG > 340, and Anti-Carbamylated Protein IgG Positive (22.53). Denies fever, chills, body aches, rash, lymphadenopathy.      He tells me that he has an appointment with his oncologist, Dr. Royal, in 2 weeks.  He had a CT chest abdomen pelvis which was unremarkable for cancer.  It did show thyroid nodules, which an ultrasound was ordered on his behalf.  He is waiting to be scheduled.              Past Medical History:   Diagnosis Date    Abnormal prostate specific antigen (PSA)     Cholelithiasis without obstruction     Erectile dysfunction     Gallstone     Hyperglycemia     Hyperlipidemia     Polyp of colon     Prediabetes     Prolapsed lumbosacral intervertebral disc     Rheumatoid factor positive     Shoulder joint pain     rt      Social History     Socioeconomic History    Marital status:     Tobacco Use    Smoking status: Every Day     Current packs/day: 1.00     Average packs/day: 1 pack/day for 50.0 years (50.0 ttl pk-yrs)     Types: Cigarettes    Smokeless tobacco: Never   Substance and Sexual Activity    Alcohol use: Yes    Drug use: Never      History reviewed. No pertinent family history.     ROS:   Review of Systems   Constitutional:  Negative for appetite change, chills and fever.   HENT:  Negative for ear discharge, ear pain, hearing loss, sinus pressure, sinus pain and tinnitus.    Eyes:  Negative for visual disturbance.   Respiratory:  Negative for cough, chest tightness, shortness of breath and wheezing.    Cardiovascular:  Negative for chest pain.   Gastrointestinal:  Negative for abdominal pain, diarrhea, nausea and vomiting.   Endocrine: Negative for polydipsia, polyphagia and polyuria.   Genitourinary:  Negative for difficulty urinating, dysuria, flank pain and hematuria.   Musculoskeletal:  Positive for arthralgias and joint swelling. Negative for back pain and neck pain.   Skin:  Negative for rash.   Neurological:  Negative for dizziness, tremors and headaches.   Hematological:  Negative for adenopathy. Does not bruise/bleed easily.   Psychiatric/Behavioral:  Negative for confusion, dysphoric mood, hallucinations, sleep disturbance and suicidal ideas.      Objective:   Physical Exam  Vitals and nursing note reviewed.   Constitutional:       General: He is not in acute distress.     Appearance: Normal appearance. He is well-developed. He is not ill-appearing.   HENT:      Head: Normocephalic and atraumatic.   Eyes:      General: No scleral icterus.     Pupils: Pupils are equal, round, and reactive to light.   Neck:      Vascular: No carotid bruit.   Cardiovascular:      Rate and Rhythm: Normal rate.      Pulses: Normal pulses.   Pulmonary:      Effort: Pulmonary effort is normal.   Abdominal:      General: Abdomen is flat.   Musculoskeletal:      Right wrist: Normal.      Left wrist:  Normal.      Right hand: Swelling present. No deformity or bony tenderness. Decreased range of motion. Decreased strength. Normal sensation. There is no disruption of two-point discrimination. Normal capillary refill. Normal pulse.      Left hand: Swelling present. No deformity or bony tenderness. Decreased range of motion. Decreased strength. Normal sensation. There is no disruption of two-point discrimination. Normal capillary refill. Normal pulse.        Hands:       Cervical back: Normal range of motion.      Right lower leg: No edema.      Left lower leg: No edema.   Skin:     General: Skin is warm and dry.      Findings: No rash.   Neurological:      Mental Status: He is alert and oriented to person, place, and time. Mental status is at baseline.   Psychiatric:         Mood and Affect: Mood normal.         Behavior: Behavior normal.         Thought Content: Thought content normal.         Judgment: Judgment normal.       Assessment:     1. Rheumatoid arthritis involving both hands with positive rheumatoid factor    2. IgG monoclonal gammopathy of uncertain significance    3. Lymphocytopenia    4. Elevated IgE level    5. Thyroid nodule      No images are attached to the encounter.   Plan:     Problem List Items Addressed This Visit          Immunology/Multi System    Rheumatoid arthritis involving both hands with positive rheumatoid factor - Primary    Overview       IGA > 214  Anti- CCP IgG > 340    Anti-Carbamylated Protein IgG Positive (22.53)          Current Assessment & Plan         PLAN    Refer to Rheumatology.   Add meloxicam for arthritis   Continue Tramadol PRN for now.          Relevant Medications    meloxicam (MOBIC) 7.5 MG tablet    Other Relevant Orders    Ambulatory referral/consult to Rheumatology    Elevated IgE level    Current Assessment & Plan       No current allergies... Eosinophils wnl       PLAN    Repeat IGE in 3 mts.             Oncology    Lymphocytopenia    Current  Assessment & Plan     Macrocytic anemia improved while he was on L-methylfolate 7.5 mg. no longer taking this medication.         WBC/lymphocytes continue to be low... have been since 2020.          PLAN    Refer to Oncology. ? Rheumatologic related vs multivitamin?                  Relevant Orders    Ambulatory referral/consult to Hematology / Oncology    IgG monoclonal gammopathy of uncertain significance    Current Assessment & Plan       GÉNESIS paraprotein detected. Data suggests monoclonal gammopathy type IgG Lambda.       PLAN    [facility].... Refer to Oncology. Defer further testing to Dr. Royal.          Relevant Orders    Ambulatory referral/consult to Hematology / Oncology       Endocrine    Thyroid nodule    Current Assessment & Plan     Pending US at this point.           Procedures     Office Visit on 03/07/2024   Component Date Value Ref Range Status    WBC 03/07/2024 2.16 (L)  4.3 - 10.8 X 10 3/ul Final    RBC 03/07/2024 4.32 (L)  4.7 - 6.1 X 10 6/ul Final    RDW-SD 03/07/2024 50.9  37 - 54 fl Final    Hemoglobin 03/07/2024 14.2  14 - 18 g/dL Final    Hematocrit 03/07/2024 42.6  42 - 52 % Final    MCV 03/07/2024 98.6 (H)  80 - 94 fl Final    MCH 03/07/2024 32.9 (H)  27 - 32 pg Final    MCHC 03/07/2024 33.3  32 - 36 g/dL Final    Platelets 03/07/2024 143  135 - 400 X 10 3/ul Final    nRBC# 03/07/2024 0.0  0 - 0.2 /100 WBC Final    nRBC Count Absolute 03/07/2024 0.000  0 - 0.012 x 10 3/ul Final    Comment: NOTE  Testing performed at:  [facility], [address] [place] [address]  [address] CLIA #:[id number]      Glucose 03/07/2024 105  82 - 115 mg/dL Final    BUN 03/07/2024 17.1  8 - 23 mg/dL Final    Creatinine 03/07/2024 0.79  0.70 - 1.20 mg/dL Final    AST 03/07/2024 23  0 - 40 U/L Final    ALT (SGPT) 03/07/2024 19  0 - 41 U/L Final    Alkaline Phosphatase 03/07/2024 87  40 - 130 U/L Final    Calcium 03/07/2024 8.8  8.6 - 10.2 mg/dL Final    Protein, Total 03/07/2024 7.2  6.4 - 8.3 g/dL Final     Albumin 03/07/2024 3.7  3.5 - 5.2 g/dL Final    BILIRUBIN, TOTAL 03/07/2024 0.48  0.00 - 1.20 mg/dL Final    Sodium 03/07/2024 142  136 - 145 mmol/L Final    Potassium 03/07/2024 4.5  3.5 - 5.1 mmol/L Final    Chloride 03/07/2024 106  98 - 107 mmol/L Final    CO2 03/07/2024 26  22 - 29 mmol/L Final    Globulin 03/07/2024 3.5  1.5 - 4.5 g/dL Final    Albumin/Globulin Ratio 03/07/2024 1.1  1.0 - 2.7 Final    BUN/Creatinine Ratio 03/07/2024 21.6 (H)  6 - 20 Final    GFR ESTIMATION 03/07/2024 92.64  >60.00 mL/min/1.73m2 Final    Anion Gap 03/07/2024 10.0  8.0 - 17.0 mmol/L Final    Comment: NOTE  Testing performed at:  [facility], [address] [place] [address] CLIA #:[id number]      [facility] 03/07/2024 28 (H)  0 - 20 mm/hr Final    Comment: NOTE  Testing performed at:  [facility], [address] [place] [address]  [address] CLIA #:[id number]      CRP QUANTITATIVE 03/07/2024 17.4 (H)  <5.0 mg/L Final    CRP QUALITATIVE 03/07/2024 POSITIVE  mg/L Final    Comment: NOTE  Testing performed at:  [facility], [address] [place] [address]  [address] CLIA #:[id number]      Additional Testing 03/07/2024 SEE BELOW   Final    Comment: Additional testing was collected and sent to a reference lab. Results  may take 7 days to 2 weeks before they are final. Reports will be sent  to ordering client via fax, pathviewer, mail, interface or   delivered.  NOTE  Testing performed at:  [facility], [address] [place] [address]  [address] CLIA #:[id number]      IgG 03/07/2024 1,115  700 - 1,600 mg/dL Final    IgA 03/07/2024 387  70 - 400 mg/dL Final    IgM 03/07/2024 54  40 - 230 mg/dL Final    Comment: NOTE  Testing performed at:  [facility], [address] [place] [address]  [address] CLIA #:[id number]      GÉNESIS Paraprotein, CSF 03/07/2024 DETECTED (A)  NOT DETECTED Final    Heavy chain components of IgD and IgE not tested.    GÉNESIS Interpretation:U 03/07/2024  See Comment:   Final    Comment: GÉNESIS paraprotein detected. Data suggests monoclonal gammopathy type  IgG Lambda.  NOTE  Testing performed at:  [facility], [address] [place] [address] CLIA #:[id number]      Total IgE 03/07/2024 377 (H)  0 - 100 IU/mL Final    Comment: NOTE  Testing performed at:  [facility], [address] [place] [address] CLIA #:[id number]      Albumin, Electrophoresis 03/07/2024 3.21  3.2 - 5.3 g/dL Final    Albumin/Globulin Ratio 03/07/2024 0.92 (L)  1.0 - 2.7 g/dL Final    Alpha 1 03/07/2024 0.32  0.1 - 0.4 g/dL Final    Alpha 2 03/07/2024 0.81  0.6 - 1 g/dL Final    BETA 03/07/2024 1.17  0.6 - 1.3 g/dL Final    GAMMA 03/07/2024 1.18  0.7 - 1.5 g/dL Final    RESTRICTED BAND 03/07/2024 0.24 (H)  0 g/dL Final    Pepsin A Interpretation 03/07/2024 See Comment:   Final    Comment: There is a paraprotein present in the gamma region of the serum  protein electrophoresis.  Interpretation by Clint Rogers MD  Authorized Signature  NOTE  Testing performed at:  [facility], [address] [place] [address] CLIA #:[id number]      Uric Acid 03/07/2024 4.9  3.4 - 7.0 mg/dL Final    Comment: NOTE  Testing performed at:  [facility], [address] [place] [address] CLIA #:[id number]      SEGMENTERS 03/07/2024 68  34 - 71 % Final    Bands 03/07/2024 1  1 - 5 % Final    Lymphocytes 03/07/2024 26  19 - 53 % Final    Monocytes 03/07/2024 2 (L)  5 - 13 % Final    Eosinophils 03/07/2024 3  1 - 7 % Final    Neutrophils Absolute 03/07/2024 1.49 (L)  2.32 - 6.70 X 10 3/ul Final    Lymphocytes Absolute 03/07/2024 0.56 (L)  0.86 - 4.75 X 10 3/ul Final    ABSOLUTE MONOCYTE 03/07/2024 0.04 (L)  0.22 - 1.08 X 10 3/ul Final    ABSOLUTE EOSINOPHIL 03/07/2024 0.06  0.04 - 0.54 X 10 3/ul Final    ABSOLUTE BASOPHIL 03/07/2024 0.00  0.00 - 0.22 X 10 3/ul Final    RBC & PLT MORPHOLOGY 03/07/2024    Final    Comment: 1+ ANISO  GIANT PLATELETS  NOTED      Total Granulocytes 03/07/2024 1.56  X 10 3/UL Final    Comment: NOTE  Testing performed at:  [facility], [address] [place]  [address] CLIA #:[id number]          No results found in the last 30 days.       Duration of encounter: 30 minutes  This includes face-to-face time and non face-to-face time preparing to see the patient (eg, review of tests), obtaining and/or reviewing separately obtained history, documenting clinical information in the electronic or other health record, independently interpreting resultsand communicating results to the patient/family/caregiver, or care coordination      DISCLAIMER: This note was prepared with [employer] voice recognition transcription software. Garbled syntax, mangled pronouns, and other bizarre constructions may be attributed to that software system.     All diagnostic data (labs/imaging) was reviewed with the patient and/or family member in the room.  All questions were answered to their liking. The patient and/or family member voiced understanding of all instructions provided. Expectations regarding follow up and treatment plan were voiced and confirmed prior to departure. The patient was given orders/instructions at the end of the visit for reference. They were instructed to notify my office if they have not been contacted for imaging/referrals/labs/results in 1-2 weeks. They voiced understanding of all of the above.     Follow up:     There are no Patient Instructions on file for this visit.     Follow up in about 6 months (around 9/14/2024), or if symptoms worsen or fail to improve.

## 2024-03-14 NOTE — ASSESSMENT & PLAN NOTE
Macrocytic anemia improved while he was on L-methylfolate 7.5 mg. no longer taking this medication.         WBC/lymphocytes continue to be low... have been since 2020.          PLAN    Refer to Oncology. ? Rheumatologic related vs multivitamin?

## 2024-03-14 NOTE — ASSESSMENT & PLAN NOTE
GÉNESIS paraprotein detected. Data suggests monoclonal gammopathy type IgG Lambda.       PLAN    [facility].... Refer to Oncology. Defer further testing to Dr. Royal.

## 2024-06-14 ENCOUNTER — OFFICE VISIT (OUTPATIENT)
Dept: FAMILY MEDICINE | Facility: CLINIC | Age: 74
End: 2024-06-14
Payer: MEDICARE

## 2024-06-14 VITALS
DIASTOLIC BLOOD PRESSURE: 61 MMHG | SYSTOLIC BLOOD PRESSURE: 129 MMHG | OXYGEN SATURATION: 96 % | BODY MASS INDEX: 29.29 KG/M2 | HEART RATE: 75 BPM | WEIGHT: 210 LBS

## 2024-06-14 DIAGNOSIS — M05.741 RHEUMATOID ARTHRITIS INVOLVING BOTH HANDS WITH POSITIVE RHEUMATOID FACTOR: Primary | ICD-10-CM

## 2024-06-14 DIAGNOSIS — D72.810 LYMPHOCYTOPENIA: ICD-10-CM

## 2024-06-14 DIAGNOSIS — E04.1 THYROID NODULE: ICD-10-CM

## 2024-06-14 DIAGNOSIS — D47.2 IGG MONOCLONAL GAMMOPATHY OF UNCERTAIN SIGNIFICANCE: ICD-10-CM

## 2024-06-14 DIAGNOSIS — L29.9 PRURITUS: ICD-10-CM

## 2024-06-14 DIAGNOSIS — M05.742 RHEUMATOID ARTHRITIS INVOLVING BOTH HANDS WITH POSITIVE RHEUMATOID FACTOR: Primary | ICD-10-CM

## 2024-06-14 DIAGNOSIS — Z09 FOLLOW-UP EXAM, 3-6 MONTHS SINCE PREVIOUS EXAM: ICD-10-CM

## 2024-06-14 DIAGNOSIS — Z79.52 LONG-TERM CORTICOSTEROID USE: ICD-10-CM

## 2024-06-14 PROCEDURE — 99214 OFFICE O/P EST MOD 30 MIN: CPT | Mod: S$GLB,,, | Performed by: NURSE PRACTITIONER

## 2024-06-14 RX ORDER — HYDROXYZINE HYDROCHLORIDE 25 MG/1
25 TABLET, FILM COATED ORAL NIGHTLY PRN
Qty: 30 TABLET | Refills: 5 | Status: SHIPPED | OUTPATIENT
Start: 2024-06-14

## 2024-06-14 NOTE — ASSESSMENT & PLAN NOTE
GÉNESIS paraprotein detected. Data suggests monoclonal gammopathy type IgG Lambda.         PLAN     [facility].... Followed by Oncology.

## 2024-06-14 NOTE — PROGRESS NOTES
Subjective:      Patient ID: Mike Rome is a 73 y.o. male.    Chief Complaint: 3MN FU      73-year-old male with a history of hyperlipidemia, ED, rheumatoid factor positive, adenocarcinoma prostate, macrocytic anemia with vitamin B12 deficiency, prediabetes, cholelithiasis, chronic right flank pain, colon polyp     Patient is here today for follow-up.  Primary complaint at this time is nocturnal pruritus.  He reports scratching his left upper extremity every night in his sleep.  Causing some abrasions.  He was given some topical corticosteroids by Dermatology.  Still suffering from pruritus.  No rash reported.     Recently diagnosed with rheumatoid arthritis.  Followed by Dr. Wakefield.  AVISE CTD showed RF , RF IGA > 214, Anti- CCP IgG > 340, and Anti-Carbamylated Protein IgG Positive (22.53). Denies fever, chills, body aches, rash, lymphadenopathy.  He is currently on prednisone 5 mg daily.  He is having hot flashes with the medication.        He tells me that he had an appointment with his oncologist, Dr. Royal.  Unsure if it was for MGUS.  He had a CT chest abdomen pelvis which was unremarkable for cancer.  It did show thyroid nodules, which an ultrasound was ordered on his behalf.  Three nodules were found.  All were biopsied with 1 coming back inconclusive.  He has now awaiting an appointment with Dr. Cristina.     He tells me CT Abd/pelvis/chest done this year. No need for LDCT or AAA screening. He is due for colonoscopy and will schedule himself.                  Past Medical History:   Diagnosis Date    Abnormal prostate specific antigen (PSA)     Cholelithiasis without obstruction     Erectile dysfunction     Gallstone     Hyperglycemia     Hyperlipidemia     Polyp of colon     Prediabetes     Prolapsed lumbosacral intervertebral disc     Rheumatoid arthritis, unspecified 2024    DR WAKEFIELD - RHEUM    Rheumatoid factor positive     Shoulder joint pain     rt      Social History     Socioeconomic  History    Marital status:    Tobacco Use    Smoking status: Every Day     Current packs/day: 1.00     Average packs/day: 1 pack/day for 50.0 years (50.0 ttl pk-yrs)     Types: Cigarettes    Smokeless tobacco: Never   Substance and Sexual Activity    Alcohol use: Yes    Drug use: Never      No family history on file.     ROS:   Review of Systems   Constitutional:  Negative for appetite change, chills, fatigue and fever.   HENT:  Negative for ear discharge, ear pain, hearing loss, sinus pressure, sinus pain and tinnitus.    Eyes:  Negative for visual disturbance.   Respiratory:  Negative for cough, chest tightness, shortness of breath and wheezing.    Cardiovascular:  Negative for chest pain.   Gastrointestinal:  Negative for abdominal pain, diarrhea, nausea and vomiting.   Endocrine: Negative for polydipsia, polyphagia and polyuria.   Genitourinary:  Negative for difficulty urinating, dysuria, flank pain and hematuria.   Musculoskeletal:  Positive for arthralgias and joint swelling. Negative for back pain and neck pain.   Skin:  Negative for rash.   Neurological:  Negative for dizziness, tremors and headaches.   Hematological:  Negative for adenopathy. Does not bruise/bleed easily.   Psychiatric/Behavioral:  Negative for confusion, dysphoric mood, hallucinations, sleep disturbance and suicidal ideas.      Objective:   Physical Exam  Vitals and nursing note reviewed.   Constitutional:       General: He is not in acute distress.     Appearance: Normal appearance. He is well-developed. He is not ill-appearing.   HENT:      Head: Normocephalic and atraumatic.   Eyes:      General: No scleral icterus.     Pupils: Pupils are equal, round, and reactive to light.   Neck:      Vascular: No carotid bruit.   Cardiovascular:      Rate and Rhythm: Normal rate and regular rhythm.      Pulses: Normal pulses.   Pulmonary:      Effort: Pulmonary effort is normal.      Breath sounds: Normal breath sounds.   Abdominal:       General: Abdomen is flat.   Musculoskeletal:      Right wrist: Normal.      Left wrist: Normal.      Right hand: Swelling present. No deformity or bony tenderness. Decreased range of motion. Decreased strength. Normal sensation. There is no disruption of two-point discrimination. Normal capillary refill. Normal pulse.      Left hand: Swelling present. No deformity or bony tenderness. Decreased range of motion. Decreased strength. Normal sensation. There is no disruption of two-point discrimination. Normal capillary refill. Normal pulse.        Hands:       Cervical back: Normal range of motion.      Right lower leg: No edema.      Left lower leg: No edema.   Skin:     General: Skin is warm and dry.      Coloration: Skin is not jaundiced.      Findings: Abrasion and ecchymosis present. No rash.          Neurological:      Mental Status: He is alert and oriented to person, place, and time. Mental status is at baseline.   Psychiatric:         Mood and Affect: Mood normal.         Behavior: Behavior normal.         Thought Content: Thought content normal.         Judgment: Judgment normal.       Assessment:     1. Rheumatoid arthritis involving both hands with positive rheumatoid factor    2. IgG monoclonal gammopathy of uncertain significance    3. Lymphocytopenia    4. Thyroid nodule    5. Long-term corticosteroid use    6. Pruritus    7. Follow-up exam, 3-6 months since previous exam      No images are attached to the encounter.   Plan:     Problem List Items Addressed This Visit          Immunology/Multi System    Rheumatoid arthritis involving both hands with positive rheumatoid factor - Primary    Overview       IGA > 214  Anti- CCP IgG > 340    Anti-Carbamylated Protein IgG Positive (22.53)          Current Assessment & Plan     Followed by Rheumatology.             Oncology    Lymphocytopenia    Current Assessment & Plan       Macrocytic anemia improved while he was on L-methylfolate 7.5 mg. no longer  taking this medication.         WBC/lymphocytes continue to be low... have been since 2020.            PLAN    Followed by Dr. Royal.            IgG monoclonal gammopathy of uncertain significance    Current Assessment & Plan       GÉNESIS paraprotein detected. Data suggests monoclonal gammopathy type IgG Lambda.         PLAN     MGUS.... Followed by Oncology.             Endocrine    Thyroid nodule    Current Assessment & Plan     Had FNA... left side pending... Has upcoming apt w/ Dr. Cristina.          Long-term corticosteroid use    Current Assessment & Plan       Currently on prednisone.  Need to consider DEXA scan in the future          Other Visit Diagnoses       Pruritus        Relevant Medications    hydrOXYzine HCL (ATARAX) 25 MG tablet    Follow-up exam, 3-6 months since previous exam        He will RTC in January for AWV.          Procedures     No visits with results within 1 Month(s) from this visit.   Latest known visit with results is:   Office Visit on 03/07/2024   Component Date Value Ref Range Status    WBC 03/07/2024 2.16 (L)  4.3 - 10.8 X 10 3/ul Final    RBC 03/07/2024 4.32 (L)  4.7 - 6.1 X 10 6/ul Final    RDW-SD 03/07/2024 50.9  37 - 54 fl Final    Hemoglobin 03/07/2024 14.2  14 - 18 g/dL Final    Hematocrit 03/07/2024 42.6  42 - 52 % Final    MCV 03/07/2024 98.6 (H)  80 - 94 fl Final    MCH 03/07/2024 32.9 (H)  27 - 32 pg Final    MCHC 03/07/2024 33.3  32 - 36 g/dL Final    Platelets 03/07/2024 143  135 - 400 X 10 3/ul Final    nRBC# 03/07/2024 0.0  0 - 0.2 /100 WBC Final    nRBC Count Absolute 03/07/2024 0.000  0 - 0.012 x 10 3/ul Final    Comment: NOTE  Testing performed at:  [facility], [address] [place] [address]  [address] CLIA #:[id number]      Glucose 03/07/2024 105  82 - 115 mg/dL Final    BUN 03/07/2024 17.1  8 - 23 mg/dL Final    Creatinine 03/07/2024 0.79  0.70 - 1.20 mg/dL Final    AST 03/07/2024 23  0 - 40 U/L Final    ALT (SGPT) 03/07/2024 19  0 - 41 U/L Final     Alkaline Phosphatase 03/07/2024 87  40 - 130 U/L Final    Calcium 03/07/2024 8.8  8.6 - 10.2 mg/dL Final    Protein, Total 03/07/2024 7.2  6.4 - 8.3 g/dL Final    Albumin 03/07/2024 3.7  3.5 - 5.2 g/dL Final    BILIRUBIN, TOTAL 03/07/2024 0.48  0.00 - 1.20 mg/dL Final    Sodium 03/07/2024 142  136 - 145 mmol/L Final    Potassium 03/07/2024 4.5  3.5 - 5.1 mmol/L Final    Chloride 03/07/2024 106  98 - 107 mmol/L Final    CO2 03/07/2024 26  22 - 29 mmol/L Final    Globulin 03/07/2024 3.5  1.5 - 4.5 g/dL Final    Albumin/Globulin Ratio 03/07/2024 1.1  1.0 - 2.7 Final    BUN/Creatinine Ratio 03/07/2024 21.6 (H)  6 - 20 Final    GFR ESTIMATION 03/07/2024 92.64  >60.00 mL/min/1.73m2 Final    Anion Gap 03/07/2024 10.0  8.0 - 17.0 mmol/L Final    Comment: NOTE  Testing performed at:  [facility], [address] [place] [address] CLIA #:[id number]      [facility] 03/07/2024 28 (H)  0 - 20 mm/hr Final    Comment: NOTE  Testing performed at:  [facility], [address] [place] [address]  [address] CLIA #:[id number]      CRP QUANTITATIVE 03/07/2024 17.4 (H)  <5.0 mg/L Final    CRP QUALITATIVE 03/07/2024 POSITIVE  mg/L Final    Comment: NOTE  Testing performed at:  [facility], [address] [place] [address]  [address] CLIA #:[id number]      Additional Testing 03/07/2024 SEE BELOW   Final    Comment: Additional testing was collected and sent to a reference lab. Results  may take 7 days to 2 weeks before they are final. Reports will be sent  to ordering client via fax, pathviewer, mail, interface or   delivered.  NOTE  Testing performed at:  [facility], [address] [place] [address]  [address] CLIA #:[id number]      IgG 03/07/2024 1,115  700 - 1,600 mg/dL Final    IgA 03/07/2024 387  70 - 400 mg/dL Final    IgM 03/07/2024 54  40 - 230 mg/dL Final    Comment: NOTE  Testing performed at:  [facility], [address] [place] [address]  [address] CLIA  #:[id number]      GÉNESIS Paraprotein, CSF 03/07/2024 DETECTED (A)  NOT DETECTED Final    Heavy chain components of IgD and IgE not tested.    GÉNESIS Interpretation:U 03/07/2024 See Comment:   Final    Comment: GÉNESIS paraprotein detected. Data suggests monoclonal gammopathy type  IgG Lambda.  NOTE  Testing performed at:  [facility], [address] [place] [address] CLIA #:[id number]      Total IgE 03/07/2024 377 (H)  0 - 100 IU/mL Final    Comment: NOTE  Testing performed at:  [facility], [address] [place], [place] [id number] CLIA #:[id number]      Albumin, Electrophoresis 03/07/2024 3.21  3.2 - 5.3 g/dL Final    Albumin/Globulin Ratio 03/07/2024 0.92 (L)  1.0 - 2.7 g/dL Final    Alpha 1 03/07/2024 0.32  0.1 - 0.4 g/dL Final    Alpha 2 03/07/2024 0.81  0.6 - 1 g/dL Final    BETA 03/07/2024 1.17  0.6 - 1.3 g/dL Final    GAMMA 03/07/2024 1.18  0.7 - 1.5 g/dL Final    RESTRICTED BAND 03/07/2024 0.24 (H)  0 g/dL Final    Pepsin A Interpretation 03/07/2024 See Comment:   Final    Comment: There is a paraprotein present in the gamma region of the serum  protein electrophoresis.  Interpretation by Clint Rogers MD  Authorized Signature  NOTE  Testing performed at:  [facility], [address] [place] [address] CLIA #:[id number]      Uric Acid 03/07/2024 4.9  3.4 - 7.0 mg/dL Final    Comment: NOTE  Testing performed at:  [facility], [address] [place] [address] CLIA #:[id number]      SEGMENTERS 03/07/2024 68  34 - 71 % Final    Bands 03/07/2024 1  1 - 5 % Final    Lymphocytes 03/07/2024 26  19 - 53 % Final    Monocytes 03/07/2024 2 (L)  5 - 13 % Final    Eosinophils 03/07/2024 3  1 - 7 % Final    Neutrophils Absolute 03/07/2024 1.49 (L)  2.32 - 6.70 X 10 3/ul Final    Lymphocytes Absolute 03/07/2024 0.56 (L)  0.86 - 4.75 X 10 3/ul Final    ABSOLUTE MONOCYTE 03/07/2024 0.04 (L)  0.22 - 1.08 X 10 3/ul Final    ABSOLUTE EOSINOPHIL 03/07/2024 0.06  0.04 -  0.54 X 10 3/ul Final    ABSOLUTE BASOPHIL 03/07/2024 0.00  0.00 - 0.22 X 10 3/ul Final    RBC & PLT MORPHOLOGY 03/07/2024    Final    Comment: 1+ ANISO  GIANT PLATELETS NOTED      Total Granulocytes 03/07/2024 1.56  X 10 3/UL Final    Comment: NOTE  Testing performed at:  [facility], [address] [place] [address]  [address] CLIA #:[id number]          No results found in the last 30 days.       Duration of encounter:  minutes  This includes face-to-face time and non face-to-face time preparing to see the patient (eg, review of tests), obtaining and/or reviewing separately obtained history, documenting clinical information in the electronic or other health record, independently interpreting resultsand communicating results to the patient/family/caregiver, or care coordination      DISCLAIMER: This note was prepared with [employer] voice recognition transcription software. Garbled syntax, mangled pronouns, and other bizarre constructions may be attributed to that software system.     All diagnostic data (labs/imaging) was reviewed with the patient and/or family member in the room.  All questions were answered to their liking. The patient and/or family member voiced understanding of all instructions provided. Expectations regarding follow up and treatment plan were voiced and confirmed prior to departure. The patient was given orders/instructions at the end of the visit for reference. They were instructed to notify my office if they have not been contacted for imaging/referrals/labs/results in 1-2 weeks. They voiced understanding of all of the above.     Follow up:     There are no Patient Instructions on file for this visit.     Follow up in about 7 months (around 1/14/2025), or if symptoms worsen or fail to improve.

## 2024-06-14 NOTE — ASSESSMENT & PLAN NOTE
Macrocytic anemia improved while he was on L-methylfolate 7.5 mg. no longer taking this medication.         WBC/lymphocytes continue to be low... have been since 2020.            PLAN    Followed by Dr. Royal.

## 2024-08-19 ENCOUNTER — OFFICE VISIT (OUTPATIENT)
Dept: FAMILY MEDICINE | Facility: CLINIC | Age: 74
End: 2024-08-19
Payer: MEDICARE

## 2024-08-19 VITALS
HEIGHT: 71 IN | HEART RATE: 67 BPM | SYSTOLIC BLOOD PRESSURE: 155 MMHG | WEIGHT: 211 LBS | OXYGEN SATURATION: 98 % | RESPIRATION RATE: 14 BRPM | BODY MASS INDEX: 29.54 KG/M2 | DIASTOLIC BLOOD PRESSURE: 73 MMHG

## 2024-08-19 DIAGNOSIS — M05.742 RHEUMATOID ARTHRITIS INVOLVING BOTH HANDS WITH POSITIVE RHEUMATOID FACTOR: ICD-10-CM

## 2024-08-19 DIAGNOSIS — R03.0 ELEVATED BLOOD PRESSURE READING IN OFFICE WITHOUT DIAGNOSIS OF HYPERTENSION: Primary | ICD-10-CM

## 2024-08-19 DIAGNOSIS — R61 ABNORMAL FLUSHING AND SWEATING: ICD-10-CM

## 2024-08-19 DIAGNOSIS — E89.0 H/O THYROIDECTOMY: ICD-10-CM

## 2024-08-19 DIAGNOSIS — M05.741 RHEUMATOID ARTHRITIS INVOLVING BOTH HANDS WITH POSITIVE RHEUMATOID FACTOR: ICD-10-CM

## 2024-08-19 DIAGNOSIS — R23.2 ABNORMAL FLUSHING AND SWEATING: ICD-10-CM

## 2024-08-19 PROBLEM — Z90.89 H/O THYROIDECTOMY: Status: ACTIVE | Noted: 2024-08-19

## 2024-08-19 PROBLEM — Z98.890 H/O THYROIDECTOMY: Status: ACTIVE | Noted: 2024-08-19

## 2024-08-19 PROCEDURE — 99213 OFFICE O/P EST LOW 20 MIN: CPT | Mod: S$GLB,,, | Performed by: NURSE PRACTITIONER

## 2024-08-19 RX ORDER — TERAZOSIN 1 MG/1
1 CAPSULE ORAL NIGHTLY
Qty: 30 CAPSULE | Refills: 11 | Status: SHIPPED | OUTPATIENT
Start: 2024-08-19 | End: 2025-08-19

## 2024-08-19 RX ORDER — OFLOXACIN 3 MG/ML
SOLUTION AURICULAR (OTIC)
COMMUNITY
Start: 2024-03-07 | End: 2024-08-19

## 2024-08-19 RX ORDER — LEVOTHYROXINE SODIUM 150 UG/1
150 TABLET ORAL EVERY MORNING
COMMUNITY
Start: 2024-08-01

## 2024-08-19 NOTE — PROGRESS NOTES
Subjective:      Patient ID: Mike Rome is a 73 y.o. male.    Chief Complaint: No chief complaint on file.      73-year-old male with a history of hyperlipidemia, ED, rheumatoid factor positive, adenocarcinoma prostate, macrocytic anemia with vitamin B12 deficiency, prediabetes, cholelithiasis, chronic right flank pain, colon polyp     Patient is here today for follow-up.  Primary complaint at this time is excessive sweating. Occurs primarily in the afternoons. Started around the time he was diagnosed with RA.  Denies fever, chills, rash.     Recently diagnosed with rheumatoid arthritis.  Followed by Dr. Wakefield.  AVISE CTD showed RF , RF IGA > 214, Anti- CCP IgG > 340, and Anti-Carbamylated Protein IgG Positive (22.53). Denies fever, chills, body aches, rash, lymphadenopathy. No longer on prednisone. Currently on infusion. Unknown name.      Recently had thyroidectomy.  Currently on Synthroid 150 mcg daily.  He has a follow-up appointment with Dr. Cristina next week for labs.     No other issues reported                 Past Medical History:   Diagnosis Date    Abnormal prostate specific antigen (PSA)     Cholelithiasis without obstruction     Erectile dysfunction     Gallstone     Hyperglycemia     Hyperlipidemia     Polyp of colon     Prediabetes     Prolapsed lumbosacral intervertebral disc     Rheumatoid arthritis, unspecified 2024    DR WAKEFIELD - RHEUM    Rheumatoid factor positive     Shoulder joint pain     rt      Social History     Socioeconomic History    Marital status:    Tobacco Use    Smoking status: Every Day     Current packs/day: 1.00     Average packs/day: 1 pack/day for 50.0 years (50.0 ttl pk-yrs)     Types: Cigarettes    Smokeless tobacco: Never   Substance and Sexual Activity    Alcohol use: Yes    Drug use: Never      No family history on file.     ROS:   Review of Systems   Constitutional:  Negative for appetite change, chills, fatigue and fever.   HENT:  Negative for ear  discharge, ear pain, hearing loss, sinus pressure, sinus pain and tinnitus.    Eyes:  Negative for visual disturbance.   Respiratory:  Negative for cough, chest tightness, shortness of breath and wheezing.    Cardiovascular:  Negative for chest pain.   Gastrointestinal:  Negative for abdominal pain, diarrhea, nausea and vomiting.   Endocrine: Negative for polydipsia, polyphagia and polyuria.   Genitourinary:  Negative for difficulty urinating, dysuria, flank pain and hematuria.   Musculoskeletal:  Positive for arthralgias and joint swelling. Negative for back pain and neck pain.   Skin:  Negative for rash.   Neurological:  Negative for dizziness, tremors and headaches.   Hematological:  Negative for adenopathy. Does not bruise/bleed easily.   Psychiatric/Behavioral:  Negative for confusion, dysphoric mood, hallucinations, sleep disturbance and suicidal ideas.      Objective:   Physical Exam  Vitals and nursing note reviewed.   Constitutional:       General: He is not in acute distress.     Appearance: Normal appearance. He is well-developed. He is not ill-appearing.   HENT:      Head: Normocephalic and atraumatic.   Eyes:      General: No scleral icterus.     Pupils: Pupils are equal, round, and reactive to light.   Neck:      Vascular: No carotid bruit.   Cardiovascular:      Rate and Rhythm: Normal rate and regular rhythm.      Pulses: Normal pulses.   Pulmonary:      Effort: Pulmonary effort is normal.      Breath sounds: Normal breath sounds.   Abdominal:      General: Abdomen is flat.   Musculoskeletal:      Right wrist: Normal.      Left wrist: Normal.      Right hand: Swelling present. No deformity or bony tenderness. Decreased range of motion. Decreased strength. Normal sensation. There is no disruption of two-point discrimination. Normal capillary refill. Normal pulse.      Left hand: Swelling present. No deformity or bony tenderness. Decreased range of motion. Decreased strength. Normal sensation. There is  no disruption of two-point discrimination. Normal capillary refill. Normal pulse.        Hands:       Cervical back: Normal range of motion.      Right lower leg: No edema.      Left lower leg: No edema.   Skin:     General: Skin is warm and dry.      Coloration: Skin is not jaundiced.      Findings: Abrasion and ecchymosis present. No rash.          Neurological:      Mental Status: He is alert and oriented to person, place, and time. Mental status is at baseline.   Psychiatric:         Mood and Affect: Mood normal.         Behavior: Behavior normal.         Thought Content: Thought content normal.         Judgment: Judgment normal.       Assessment:     1. Elevated blood pressure reading in office without diagnosis of hypertension    2. H/O thyroidectomy    3. Abnormal flushing and sweating    4. Rheumatoid arthritis involving both hands with positive rheumatoid factor      No images are attached to the encounter.   Plan:     Problem List Items Addressed This Visit          Immunology/Multi System    Rheumatoid arthritis involving both hands with positive rheumatoid factor    Overview       IGA > 214  Anti- CCP IgG > 340    Anti-Carbamylated Protein IgG Positive (22.53)          Current Assessment & Plan     Followed by Rheumatology... currently on infusion. Does not know the name.             Endocrine    H/O thyroidectomy    Current Assessment & Plan     Currently on Synthroid 150 mcg.  Followed by Dr. Cristina          Other Visit Diagnoses       Elevated blood pressure reading in office without diagnosis of hypertension    -  Primary    monitor.    Abnormal flushing and sweating        Will try Hytrin 1 mg QHS. See avs for med education.    Relevant Medications    terazosin (HYTRIN) 1 MG capsule          Procedures     No visits with results within 1 Month(s) from this visit.   Latest known visit with results is:   Office Visit on 03/07/2024   Component Date Value Ref Range Status    WBC 03/07/2024  2.16 (L)  4.3 - 10.8 X 10 3/ul Final    RBC 03/07/2024 4.32 (L)  4.7 - 6.1 X 10 6/ul Final    RDW-SD 03/07/2024 50.9  37 - 54 fl Final    Hemoglobin 03/07/2024 14.2  14 - 18 g/dL Final    Hematocrit 03/07/2024 42.6  42 - 52 % Final    MCV 03/07/2024 98.6 (H)  80 - 94 fl Final    MCH 03/07/2024 32.9 (H)  27 - 32 pg Final    MCHC 03/07/2024 33.3  32 - 36 g/dL Final    Platelets 03/07/2024 143  135 - 400 X 10 3/ul Final    nRBC# 03/07/2024 0.0  0 - 0.2 /100 WBC Final    nRBC Count Absolute 03/07/2024 0.000  0 - 0.012 x 10 3/ul Final    Comment: NOTE  Testing performed at:  [facility], [address] [place] [address] CLIA #:[id number]      Glucose 03/07/2024 105  82 - 115 mg/dL Final    BUN 03/07/2024 17.1  8 - 23 mg/dL Final    Creatinine 03/07/2024 0.79  0.70 - 1.20 mg/dL Final    AST 03/07/2024 23  0 - 40 U/L Final    ALT (SGPT) 03/07/2024 19  0 - 41 U/L Final    Alkaline Phosphatase 03/07/2024 87  40 - 130 U/L Final    Calcium 03/07/2024 8.8  8.6 - 10.2 mg/dL Final    Protein, Total 03/07/2024 7.2  6.4 - 8.3 g/dL Final    Albumin 03/07/2024 3.7  3.5 - 5.2 g/dL Final    BILIRUBIN, TOTAL 03/07/2024 0.48  0.00 - 1.20 mg/dL Final    Sodium 03/07/2024 142  136 - 145 mmol/L Final    Potassium 03/07/2024 4.5  3.5 - 5.1 mmol/L Final    Chloride 03/07/2024 106  98 - 107 mmol/L Final    CO2 03/07/2024 26  22 - 29 mmol/L Final    Globulin 03/07/2024 3.5  1.5 - 4.5 g/dL Final    Albumin/Globulin Ratio 03/07/2024 1.1  1.0 - 2.7 Final    BUN/Creatinine Ratio 03/07/2024 21.6 (H)  6 - 20 Final    GFR ESTIMATION 03/07/2024 92.64  >60.00 mL/min/1.73m2 Final    Anion Gap 03/07/2024 10.0  8.0 - 17.0 mmol/L Final    Comment: NOTE  Testing performed at:  [facility], [address] [place] [address]  [id number] CLIA #:[id number]      SED RATE ([facility]) 03/07/2024 28 (H)  0 - 20 mm/hr Final    Comment: NOTE  Testing performed at:  [facility], [address] [place] [address]  [address] CLIA  #:[id number]      CRP QUANTITATIVE 03/07/2024 17.4 (H)  <5.0 mg/L Final    CRP QUALITATIVE 03/07/2024 POSITIVE  mg/L Final    Comment: NOTE  Testing performed at:  [facility], [address] [place] [address]  [address] CLIA #:[id number]      Additional Testing 03/07/2024 SEE BELOW   Final    Comment: Additional testing was collected and sent to a reference lab. Results  may take 7 days to 2 weeks before they are final. Reports will be sent  to ordering client via fax, pathviewer, mail, interface or   delivered.  NOTE  Testing performed at:  [facility], [address] [place] [address] CLIA #:[id number]      IgG 03/07/2024 1,115  700 - 1,600 mg/dL Final    IgA 03/07/2024 387  70 - 400 mg/dL Final    IgM 03/07/2024 54  40 - 230 mg/dL Final    Comment: NOTE  Testing performed at:  [facility], [address] [place] [address]  [address] CLIA #:[id number]      GÉNESIS Paraprotein, CSF 03/07/2024 DETECTED (A)  NOT DETECTED Final    Heavy chain components of IgD and IgE not tested.    GÉNESIS Interpretation:U 03/07/2024 See Comment:   Final    Comment: GÉNESIS paraprotein detected. Data suggests monoclonal gammopathy type  IgG Lambda.  NOTE  Testing performed at:  [facility], [address] [place] [address]  [address] CLIA #:[id number]      Total IgE 03/07/2024 377 (H)  0 - 100 IU/mL Final    Comment: NOTE  Testing performed at:  [facility], [address] [place] [address]  [address] CLIA #:[id number]      Albumin, Electrophoresis 03/07/2024 3.21  3.2 - 5.3 g/dL Final    Albumin/Globulin Ratio 03/07/2024 0.92 (L)  1.0 - 2.7 g/dL Final    Alpha 1 03/07/2024 0.32  0.1 - 0.4 g/dL Final    Alpha 2 03/07/2024 0.81  0.6 - 1 g/dL Final    BETA 03/07/2024 1.17  0.6 - 1.3 g/dL Final    GAMMA 03/07/2024 1.18  0.7 - 1.5 g/dL Final    RESTRICTED BAND 03/07/2024 0.24 (H)  0 g/dL Final    Pepsin A Interpretation 03/07/2024 See Comment:   Final    Comment: There is a paraprotein  present in the gamma region of the serum  protein electrophoresis.  Interpretation by Clint Rogers MD  Authorized Signature  NOTE  Testing performed at:  [facility], [address] [place] [address]  [address] CLIA #:[id number]      Uric Acid 03/07/2024 4.9  3.4 - 7.0 mg/dL Final    Comment: NOTE  Testing performed at:  [facility], [address] [place] [address]  [address] CLIA #:[id number]      SEGMENTERS 03/07/2024 68  34 - 71 % Final    Bands 03/07/2024 1  1 - 5 % Final    Lymphocytes 03/07/2024 26  19 - 53 % Final    Monocytes 03/07/2024 2 (L)  5 - 13 % Final    Eosinophils 03/07/2024 3  1 - 7 % Final    Neutrophils Absolute 03/07/2024 1.49 (L)  2.32 - 6.70 X 10 3/ul Final    Lymphocytes Absolute 03/07/2024 0.56 (L)  0.86 - 4.75 X 10 3/ul Final    ABSOLUTE MONOCYTE 03/07/2024 0.04 (L)  0.22 - 1.08 X 10 3/ul Final    ABSOLUTE EOSINOPHIL 03/07/2024 0.06  0.04 - 0.54 X 10 3/ul Final    ABSOLUTE BASOPHIL 03/07/2024 0.00  0.00 - 0.22 X 10 3/ul Final    RBC & PLT MORPHOLOGY 03/07/2024    Final    Comment: 1+ ANISO  GIANT PLATELETS NOTED      Total Granulocytes 03/07/2024 1.56  X 10 3/UL Final    Comment: NOTE  Testing performed at:  [facility], [address] [place] [address]  [address] CLIA #:[id number]          No results found in the last 30 days.       Duration of encounter:  minutes  This includes face-to-face time and non face-to-face time preparing to see the patient (eg, review of tests), obtaining and/or reviewing separately obtained history, documenting clinical information in the electronic or other health record, independently interpreting resultsand communicating results to the patient/family/caregiver, or care coordination      DISCLAIMER: This note was prepared with [employer] voice recognition transcription software. Garbled syntax, mangled pronouns, and other bizarre constructions may be attributed to that software system.     All diagnostic data (labs/imaging) was reviewed  with the patient and/or family member in the room.  All questions were answered to their liking. The patient and/or family member voiced understanding of all instructions provided. Expectations regarding follow up and treatment plan were voiced and confirmed prior to departure. The patient was given orders/instructions at the end of the visit for reference. They were instructed to notify my office if they have not been contacted for imaging/referrals/labs/results in 1-2 weeks. They voiced understanding of all of the above.     Follow up:     Patient Instructions      Terazosin (ter AY kurt sin)   Brand Names: Francisco APO-Terazosin; DOM-Terazosin; PMS-Terazosin; TEVA-Terazosin   What is this drug used for?   It is used to treat high blood pressure.  It is used to treat the signs of an enlarged prostate.  It may be given to you for other reasons. Talk with the doctor.     What do I need to tell my doctor BEFORE I take this drug?   If you are allergic to this drug; any part of this drug; or any other drugs, foods, or substances. Tell your doctor about the allergy and what signs you had.  This drug may interact with other drugs or health problems.  Tell your doctor and pharmacist about all of your drugs (prescription or OTC, natural products, vitamins) and health problems. You must check to make sure that it is safe for you to take this drug with all of your drugs and health problems. Do not start, stop, or change the dose of any drug without checking with your doctor.  What are some things I need to know or do while I take this drug?   Tell all of your health care providers that you take this drug. This includes your doctors, nurses, pharmacists, and dentists.  Do not drive or do other tasks that call for you to be alert for 12 hours after the first dose. Also avoid these tasks for 12 hours after any increase in dose and if you restart this drug after stopping it. Be sure you know how this drug affects you before driving  or doing other tasks.  To lower the chance of feeling dizzy or passing out, rise slowly if you have been sitting or lying down. Be careful going up and down stairs.  If you are having cataract surgery or other eye procedure, talk with your doctor.  Check your blood pressure as you have been told.  If taking for an enlarged prostate, have a rectal exam (to check prostate gland) and blood work (PSA test) as you have been told by the doctor.  If you are taking this drug and have high blood pressure, talk with your doctor before using OTC products that may raise blood pressure. These include cough or cold drugs, diet pills, stimulants, non-steroidal anti-inflammatory drugs (NSAIDs) like ibuprofen or naproxen, and some natural products or aids.  Talk with your doctor before you drink alcohol.  Tell your doctor if you are pregnant, plan on getting pregnant, or are breast-feeding. You will need to talk about the benefits and risks to you and the baby.     What are some side effects that I need to call my doctor about right away?   WARNING/CAUTION: Even though it may be rare, some people may have very bad and sometimes deadly side effects when taking a drug. Tell your doctor or get medical help right away if you have any of the following signs or symptoms that may be related to a very bad side effect:  Signs of an allergic reaction, like rash; hives; itching; red, swollen, blistered, or peeling skin with or without fever; wheezing; tightness in the chest or throat; trouble breathing, swallowing, or talking; unusual hoarseness; or swelling of the mouth, face, lips, tongue, or throat.  Very bad dizziness or passing out.  Change in eyesight.  Fast or abnormal heartbeat.  Swelling in the arms or legs.  Call your doctor right away if you have a painful erection (hard penis) or an erection that lasts for longer than 4 hours. This may happen even when you are not having sex. If this is not treated right away, it may lead to  lasting sex problems and you may not be able to have sex.  What are some other side effects of this drug?   All drugs may cause side effects. However, many people have no side effects or only have minor side effects. Call your doctor or get medical help if any of these side effects or any other side effects bother you or do not go away:  Feeling dizzy, sleepy, tired, or weak.  Stuffy nose.  Headache.  These are not all of the side effects that may occur. If you have questions about side effects, call your doctor. Call your doctor for medical advice about side effects.  You may report side effects to your national health agency.  You may report side effects to the FDA at 1-835.388.2889. You may also report side effects at https://www.fda.gov/medwatch.  How is this drug best taken?   Use this drug as ordered by your doctor. Read all information given to you. Follow all instructions closely.  Take this drug at bedtime.  Keep taking this drug as you have been told by your doctor or other health care provider, even if you feel well.  What do I do if I miss a dose?   Take a missed dose as soon as you think about it.  If it is close to the time for your next dose, skip the missed dose and go back to your normal time.  Do not take 2 doses at the same time or extra doses.  If you miss 2 or more days of this drug, call your doctor to find out how to restart.     How do I store and/or throw out this drug?   Store at room temperature protected from light. Store in a dry place. Do not store in a bathroom.  Keep all drugs in a safe place. Keep all drugs out of the reach of children and pets.  Throw away unused or  drugs. Do not flush down a toilet or pour down a drain unless you are told to do so. Check with your pharmacist if you have questions about the best way to throw out drugs. There may be drug take-back programs in your area.     General drug facts   If your symptoms or health problems do not get better or if they  become worse, call your doctor.  Do not share your drugs with others and do not take anyone else's drugs.  Some drugs may have another patient information leaflet. If you have any questions about this drug, please talk with your doctor, nurse, pharmacist, or other health care provider.  Some drugs may have another patient information leaflet. Check with your pharmacist. If you have any questions about this drug, please talk with your doctor, nurse, pharmacist, or other health care provider.  If you think there has been an overdose, call your poison control center or get medical care right away. Be ready to tell or show what was taken, how much, and when it happened.     Consumer Information Use and Disclaimer   This generalized information is a limited summary of diagnosis, treatment, and/or medication information. It is not meant to be comprehensive and should be used as a tool to help the user understand and/or assess potential diagnostic and treatment options. It does NOT include all information about conditions, treatments, medications, side effects, or risks that may apply to a specific patient. It is not intended to be medical advice or a substitute for the medical advice, diagnosis, or treatment of a health care provider based on the health care provider's examination and assessment of a patient's specific and unique circumstances. Patients must speak with a health care provider for complete information about their health, medical questions, and treatment options, including any risks or benefits regarding use of medications. This information does not endorse any treatments or medications as safe, effective, or approved for treating a specific patient. [employer] and its affiliates disclaim any warranty or liability relating to this information or the use thereof. The use of this information is governed by the Terms of Use, available at https://www.[employer].com/en/solutions/lexicomp/about/renee.  Last  Reviewed Date   2020-05-20  Copyright   © 2021 [employer] and its affiliates and/or licensors. All rights reserved.        Follow up in about 6 months (around 2/19/2025), or if symptoms worsen or fail to improve.

## 2024-08-19 NOTE — PATIENT INSTRUCTIONS
Terazosin (ter AY kurt sin)   Brand Names: Francisco APO-Terazosin; DOM-Terazosin; PMS-Terazosin; TEVA-Terazosin   What is this drug used for?   It is used to treat high blood pressure.  It is used to treat the signs of an enlarged prostate.  It may be given to you for other reasons. Talk with the doctor.     What do I need to tell my doctor BEFORE I take this drug?   If you are allergic to this drug; any part of this drug; or any other drugs, foods, or substances. Tell your doctor about the allergy and what signs you had.  This drug may interact with other drugs or health problems.  Tell your doctor and pharmacist about all of your drugs (prescription or OTC, natural products, vitamins) and health problems. You must check to make sure that it is safe for you to take this drug with all of your drugs and health problems. Do not start, stop, or change the dose of any drug without checking with your doctor.  What are some things I need to know or do while I take this drug?   Tell all of your health care providers that you take this drug. This includes your doctors, nurses, pharmacists, and dentists.  Do not drive or do other tasks that call for you to be alert for 12 hours after the first dose. Also avoid these tasks for 12 hours after any increase in dose and if you restart this drug after stopping it. Be sure you know how this drug affects you before driving or doing other tasks.  To lower the chance of feeling dizzy or passing out, rise slowly if you have been sitting or lying down. Be careful going up and down stairs.  If you are having cataract surgery or other eye procedure, talk with your doctor.  Check your blood pressure as you have been told.  If taking for an enlarged prostate, have a rectal exam (to check prostate gland) and blood work (PSA test) as you have been told by the doctor.  If you are taking this drug and have high blood pressure, talk with your doctor before using OTC products that may raise blood  pressure. These include cough or cold drugs, diet pills, stimulants, non-steroidal anti-inflammatory drugs (NSAIDs) like ibuprofen or naproxen, and some natural products or aids.  Talk with your doctor before you drink alcohol.  Tell your doctor if you are pregnant, plan on getting pregnant, or are breast-feeding. You will need to talk about the benefits and risks to you and the baby.     What are some side effects that I need to call my doctor about right away?   WARNING/CAUTION: Even though it may be rare, some people may have very bad and sometimes deadly side effects when taking a drug. Tell your doctor or get medical help right away if you have any of the following signs or symptoms that may be related to a very bad side effect:  Signs of an allergic reaction, like rash; hives; itching; red, swollen, blistered, or peeling skin with or without fever; wheezing; tightness in the chest or throat; trouble breathing, swallowing, or talking; unusual hoarseness; or swelling of the mouth, face, lips, tongue, or throat.  Very bad dizziness or passing out.  Change in eyesight.  Fast or abnormal heartbeat.  Swelling in the arms or legs.  Call your doctor right away if you have a painful erection (hard penis) or an erection that lasts for longer than 4 hours. This may happen even when you are not having sex. If this is not treated right away, it may lead to lasting sex problems and you may not be able to have sex.  What are some other side effects of this drug?   All drugs may cause side effects. However, many people have no side effects or only have minor side effects. Call your doctor or get medical help if any of these side effects or any other side effects bother you or do not go away:  Feeling dizzy, sleepy, tired, or weak.  Stuffy nose.  Headache.  These are not all of the side effects that may occur. If you have questions about side effects, call your doctor. Call your doctor for medical advice about side  effects.  You may report side effects to your national health agency.  You may report side effects to the FDA at 1-377.773.1692. You may also report side effects at https://www.fda.gov/medwatch.  How is this drug best taken?   Use this drug as ordered by your doctor. Read all information given to you. Follow all instructions closely.  Take this drug at bedtime.  Keep taking this drug as you have been told by your doctor or other health care provider, even if you feel well.  What do I do if I miss a dose?   Take a missed dose as soon as you think about it.  If it is close to the time for your next dose, skip the missed dose and go back to your normal time.  Do not take 2 doses at the same time or extra doses.  If you miss 2 or more days of this drug, call your doctor to find out how to restart.     How do I store and/or throw out this drug?   Store at room temperature protected from light. Store in a dry place. Do not store in a bathroom.  Keep all drugs in a safe place. Keep all drugs out of the reach of children and pets.  Throw away unused or  drugs. Do not flush down a toilet or pour down a drain unless you are told to do so. Check with your pharmacist if you have questions about the best way to throw out drugs. There may be drug take-back programs in your area.     General drug facts   If your symptoms or health problems do not get better or if they become worse, call your doctor.  Do not share your drugs with others and do not take anyone else's drugs.  Some drugs may have another patient information leaflet. If you have any questions about this drug, please talk with your doctor, nurse, pharmacist, or other health care provider.  Some drugs may have another patient information leaflet. Check with your pharmacist. If you have any questions about this drug, please talk with your doctor, nurse, pharmacist, or other health care provider.  If you think there has been an overdose, call your poison control  center or get medical care right away. Be ready to tell or show what was taken, how much, and when it happened.     Consumer Information Use and Disclaimer   This generalized information is a limited summary of diagnosis, treatment, and/or medication information. It is not meant to be comprehensive and should be used as a tool to help the user understand and/or assess potential diagnostic and treatment options. It does NOT include all information about conditions, treatments, medications, side effects, or risks that may apply to a specific patient. It is not intended to be medical advice or a substitute for the medical advice, diagnosis, or treatment of a health care provider based on the health care provider's examination and assessment of a patient's specific and unique circumstances. Patients must speak with a health care provider for complete information about their health, medical questions, and treatment options, including any risks or benefits regarding use of medications. This information does not endorse any treatments or medications as safe, effective, or approved for treating a specific patient. [employer] and its affiliates disclaim any warranty or liability relating to this information or the use thereof. The use of this information is governed by the Terms of Use, available at https://www.[employer].com/en/solutions/lexicomp/about/renee.  Last Reviewed Date   2020-05-20  Copyright   © 2021 [employer] and its affiliates and/or licensors. All rights reserved.

## 2024-09-17 RX ORDER — LEVOTHYROXINE SODIUM 150 UG/1
150 TABLET ORAL EVERY MORNING
Qty: 90 TABLET | Refills: 3 | Status: SHIPPED | OUTPATIENT
Start: 2024-09-17

## 2024-09-17 NOTE — TELEPHONE ENCOUNTER
----- Message from Lulu Fabian sent at 9/17/2024  1:56 PM CDT -----  Contact: pt  Pt calling for refill for levothyroxine (SYNTHROID) 150 MCG and would like 90 supply and he can be reached [phone]       [address] [place], [place] - [address] [address]  Phone: [phone] Fax: [phone]

## 2024-10-23 ENCOUNTER — PATIENT MESSAGE (OUTPATIENT)
Dept: RESEARCH | Facility: HOSPITAL | Age: 74
End: 2024-10-23
Payer: MEDICARE

## 2025-01-14 ENCOUNTER — OFFICE VISIT (OUTPATIENT)
Dept: FAMILY MEDICINE | Facility: CLINIC | Age: 75
End: 2025-01-14
Payer: MEDICARE

## 2025-01-14 VITALS
WEIGHT: 218 LBS | DIASTOLIC BLOOD PRESSURE: 81 MMHG | OXYGEN SATURATION: 97 % | BODY MASS INDEX: 30.4 KG/M2 | HEART RATE: 65 BPM | SYSTOLIC BLOOD PRESSURE: 137 MMHG

## 2025-01-14 DIAGNOSIS — M05.741 RHEUMATOID ARTHRITIS INVOLVING BOTH HANDS WITH POSITIVE RHEUMATOID FACTOR: ICD-10-CM

## 2025-01-14 DIAGNOSIS — E89.0 POSTOPERATIVE HYPOTHYROIDISM: ICD-10-CM

## 2025-01-14 DIAGNOSIS — Z11.59 ENCOUNTER FOR HEPATITIS C VIRUS SCREENING TEST FOR HIGH RISK PATIENT: ICD-10-CM

## 2025-01-14 DIAGNOSIS — Z91.89 ENCOUNTER FOR HEPATITIS C VIRUS SCREENING TEST FOR HIGH RISK PATIENT: ICD-10-CM

## 2025-01-14 DIAGNOSIS — H61.21 IMPACTED CERUMEN OF RIGHT EAR: ICD-10-CM

## 2025-01-14 DIAGNOSIS — Z79.899 LONG TERM USE OF DRUG: ICD-10-CM

## 2025-01-14 DIAGNOSIS — Z96.22 S/P TYMPANIC TUBE INSERTION: ICD-10-CM

## 2025-01-14 DIAGNOSIS — D72.810 LYMPHOCYTOPENIA: ICD-10-CM

## 2025-01-14 DIAGNOSIS — Z28.21 REFUSED PNEUMOCOCCAL VACCINATION: ICD-10-CM

## 2025-01-14 DIAGNOSIS — R76.8 RHEUMATOID FACTOR POSITIVE: ICD-10-CM

## 2025-01-14 DIAGNOSIS — K59.00 CONSTIPATION, UNSPECIFIED CONSTIPATION TYPE: ICD-10-CM

## 2025-01-14 DIAGNOSIS — C73 MALIGNANT NEOPLASM OF THYROID GLAND: ICD-10-CM

## 2025-01-14 DIAGNOSIS — Z13.1 SCREENING FOR DIABETES MELLITUS: ICD-10-CM

## 2025-01-14 DIAGNOSIS — Z00.00 PREVENTATIVE HEALTH CARE: Primary | ICD-10-CM

## 2025-01-14 DIAGNOSIS — G72.0 STATIN MYOPATHY: ICD-10-CM

## 2025-01-14 DIAGNOSIS — C61 ADENOCARCINOMA OF PROSTATE: ICD-10-CM

## 2025-01-14 DIAGNOSIS — T46.6X5A STATIN MYOPATHY: ICD-10-CM

## 2025-01-14 DIAGNOSIS — Z28.21 REFUSED INFLUENZA VACCINE: ICD-10-CM

## 2025-01-14 DIAGNOSIS — M05.742 RHEUMATOID ARTHRITIS INVOLVING BOTH HANDS WITH POSITIVE RHEUMATOID FACTOR: ICD-10-CM

## 2025-01-14 DIAGNOSIS — E89.0 H/O THYROIDECTOMY: ICD-10-CM

## 2025-01-14 PROCEDURE — 99214 OFFICE O/P EST MOD 30 MIN: CPT | Mod: 25,S$GLB,ICN, | Performed by: NURSE PRACTITIONER

## 2025-01-14 PROCEDURE — 99397 PER PM REEVAL EST PAT 65+ YR: CPT | Mod: GZ,25,S$GLB, | Performed by: NURSE PRACTITIONER

## 2025-01-14 PROCEDURE — 69209 REMOVE IMPACTED EAR WAX UNI: CPT | Mod: RT,S$GLB,ICN, | Performed by: NURSE PRACTITIONER

## 2025-01-14 NOTE — ASSESSMENT & PLAN NOTE
- patient initially went to see Dr. Cosme Miranda for curvature of the penis  - patient was diagnosed with prostate cancer in 2019 and subsequently underwent prostatectomy; PSA post was 0.02, which then was reported as < 0.064  - pathologic staging was [id number], [place] 7  - he then underwent radiation with Dr. Garcia  - PSA on 6/7/2022 was 0.205; most recent PSA on 9/20/2022 was 0.216  - patient had a PET scan on 8/2/2022 which showed a small lymph node along the right internal iliac chain measuring 3-4 mm with SUV of 6; there was also a small focus of uptake in the right iliac bone with no definite CT correlate  - more recent CT scan of the pelvis on 9/14/2022 showed subtle sclerotic lesions of the bilateral femoral head/neck, likely bone islands       1/14/25    Followed by Dr. Miranda. PET Scan negative. Bone scan negative. Has CT abd/pelvis upcoming.

## 2025-01-14 NOTE — PATIENT INSTRUCTIONS
What do I need to tell my doctor BEFORE I take this drug?   If you are allergic to this drug; any part of this drug; or any other drugs, foods, or substances. Tell your doctor about the allergy and what signs you had.  If you have a bowel block.  This is not a list of all drugs or health problems that interact with this drug.  Tell your doctor and pharmacist about all of your drugs (prescription or OTC, natural products, vitamins) and health problems. You must check to make sure that it is safe for you to take this drug with all of your drugs and health problems. Do not start, stop, or change the dose of any drug without checking with your doctor.  What are some things I need to know or do while I take this drug?   Tell all of your health care providers that you take this drug. This includes your doctors, nurses, pharmacists, and dentists.  If you get diarrhea, you will need to make sure to avoid getting dehydrated. Drink plenty of fluids and watch for weight loss. Talk with your doctor.  Keep this drug away from children. Children who take this drug by accident, especially children younger than 2 years of age, may have severe side effects, like severe diarrhea and dehydration. If a child takes this drug by accident, get medical help right away.  Tell your doctor if you are pregnant, plan on getting pregnant, or are breast-feeding. You will need to talk about the benefits and risks to you and the baby.     What are some side effects that I need to call my doctor about right away?   WARNING/CAUTION: Even though it may be rare, some people may have very bad and sometimes deadly side effects when taking a drug. Tell your doctor or get medical help right away if you have any of the following signs or symptoms that may be related to a very bad side effect:  Signs of an allergic reaction, like rash; hives; itching; red, swollen, blistered, or peeling skin with or without fever; wheezing; tightness in the chest or  throat; trouble breathing, swallowing, or talking; unusual hoarseness; or swelling of the mouth, face, lips, tongue, or throat.  Black, tarry, or bloody stools.  Swelling of belly.  Bloating.  Sometimes, very bad diarrhea has led to the need to go to the hospital. Call your doctor right away if you have very bad diarrhea or diarrhea that will not go away. Call your doctor right away if you have signs of dehydration like very bad dizziness or passing out, not able to pass urine or change in how much urine is passed, or feeling very tired.  What are some other side effects of this drug?   All drugs may cause side effects. However, many people have no side effects or only have minor side effects. Call your doctor or get medical help if any of these side effects or any other side effects bother you or do not go away:  Stomach pain or diarrhea.  Gas.  Signs of a common cold.  These are not all of the side effects that may occur. If you have questions about side effects, call your doctor. Call your doctor for medical advice about side effects.  You may report side effects to your national health agency.  You may report side effects to the FDA at 1-481.495.7127. You may also report side effects at https://www.fda.gov/medwatch.  How is this drug best taken?   Use this drug as ordered by your doctor. Read all information given to you. Follow all instructions closely.  Take on an empty stomach. Take at least 30 minutes before the first meal of the day.  Swallow capsule whole. Do not chew, break, or crush.  If you have trouble swallowing, you may mix this drug with applesauce or water. Follow how to mix as you have been told or read the package insert. If you are not sure how to mix this drug, call your doctor or pharmacist.  If mixed, swallow the mixed drug right away. Do not store for use at a later time.  Do not chew the mixture.  Those who have feeding tubes may use this drug. Use as you have been told. Flush the feeding  tube after this drug is given.  What do I do if I miss a dose?   Skip the missed dose and go back to your normal time.  Do not take 2 doses at the same time or extra doses.     How do I store and/or throw out this drug?   Store at room temperature in a dry place. Do not store in a bathroom.  Store in the original container. Do not take out the antimoisture cube or packet.  Keep lid tightly closed.  Keep all drugs in a safe place. Keep all drugs out of the reach of children and pets.  Throw away unused or  drugs. Do not flush down a toilet or pour down a drain unless you are told to do so. Check with your pharmacist if you have questions about the best way to throw out drugs. There may be drug take-back programs in your area.     General drug facts   If your symptoms or health problems do not get better or if they become worse, call your doctor.  Do not share your drugs with others and do not take anyone else's drugs.  Some drugs may have another patient information leaflet. If you have any questions about this drug, please talk with your doctor, nurse, pharmacist, or other health care provider.  This drug comes with an extra patient fact sheet called a Medication Guide. Read it with care. Read it again each time this drug is refilled. If you have any questions about this drug, please talk with the doctor, pharmacist, or other health care provider.  If you think there has been an overdose, call your poison control center or get medical care right away. Be ready to tell or show what was taken, how much, and when it happened.     Consumer Information Use and Disclaimer   This generalized information is a limited summary of diagnosis, treatment, and/or medication information. It is not meant to be comprehensive and should be used as a tool to help the user understand and/or assess potential diagnostic and treatment options. It does NOT include all information about conditions, treatments, medications, side  effects, or risks that may apply to a specific patient. It is not intended to be medical advice or a substitute for the medical advice, diagnosis, or treatment of a health care provider based on the health care provider's examination and assessment of a patient's specific and unique circumstances. Patients must speak with a health care provider for complete information about their health, medical questions, and treatment options, including any risks or benefits regarding use of medications. This information does not endorse any treatments or medications as safe, effective, or approved for treating a specific patient. Up[employer], [employer] and its affiliates disclaim any warranty or liability relating to this information or the use thereof. The use of this information is governed by the Terms of Use, available at https://www.[employer].com/en/solutions/lexicomp/about/renee.  Last Reviewed Date   2021-09-08  Copyright   © 2021 [employer], [employer] and its affiliates and/or licensors. All rights reserved.

## 2025-01-14 NOTE — ASSESSMENT & PLAN NOTE
PT given sample of Linzess 72 mcg and Linzess 145 mcg with instruction to start w/ 72.  Advised to take the med on empty stomach. Do not eat for 1 hour. Can titrate up as needed after 3-4 days of taking the med.

## 2025-01-14 NOTE — ASSESSMENT & PLAN NOTE
Followed by Dr. Adair. Controlled. Will continue to monitor.  Continue current meds and f/u in 6 months. RTC sooner if symptoms persist.

## 2025-01-14 NOTE — PROGRESS NOTES
Subjective:      Patient ID: Mike Rome is a 74 y.o. male.    Chief Complaint: 6MN      74-year-old male with a history of hyperlipidemia, ED, rheumatoid factor positive, adenocarcinoma prostate, macrocytic anemia with vitamin B12 deficiency, prediabetes, cholelithiasis, chronic right flank pain, colon polyp    status post total thyroidectomy that took place July 16, 2024. Here for 6-month return to clinic. 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 He has been following with Dr. Sultana as well considering radioactive iodine. He reports hot flashes. No other new symptoms. He is currently on 150 mg of Synthroid daily but does have elevated labs.      Recently diagnosed with rheumatoid arthritis.  Followed by Dr. Adair.  AVISE CTD showed RF , RF IGA > 214, Anti- CCP IgG > 340, and Anti-Carbamylated Protein IgG Positive (22.53). Denies fever, chills, body aches, rash, lymphadenopathy. No longer on prednisone. Currently on monthly infusion. Unknown name.       [facility] [place]   - patient initially went to see Dr. Cosme Miranda for curvature of the penis  - patient was diagnosed with prostate cancer in 2019 and subsequently underwent prostatectomy; PSA post was 0.02, which then was reported as < 0.064  - pathologic staging was pT3aN0, [place] 7  - he then underwent radiation with Dr. Garcia  - PSA on 6/7/2022 was 0.205; most recent PSA on 9/20/2022 was 0.216  - patient had a PET scan on 8/2/2022 which showed a small lymph node along the right internal iliac chain measuring 3-4 mm with SUV of 6; there was also a small focus of uptake in the right iliac bone with no definite CT correlate  - more recent CT scan of the pelvis on 9/14/2022 showed subtle sclerotic lesions of the bilateral femoral head/neck, likely bone islands           The patient is here today for his annual wellness visit.  He refuses all vaccination.  Refuses low-dose CT of the chest.   He gets this done through his hematologist.       Only complaint today is itching and hearing loss of the right ear.  Patient states he has borderline needing hearing aids according to his audiology exam.  He states he recurrently gets wax in his right ear despite using peroxide.  He is utilizing a Ahmet pin  to clean his ears. Denies pain, tinnitus, dizziness.       Patient states he fell out of bed last night after having a vivid dream.  He struck his head on the nightstand this posterior to the right ear.  He has a small hematoma.  Denies loss of consciousness.  Denies headache, neck pain, visual disturbance, laceration, dizziness, near-syncope.  Denies nausea, vomiting.       He also reports constipation.  He has been trying laxatives without much improvement.  Denies abdominal pain.  Last bowel movement was yesterday.      No other issues reported                 Past Medical History:   Diagnosis Date    Abnormal prostate specific antigen (PSA)     Cholelithiasis without obstruction     Erectile dysfunction     Gallstone     Hyperglycemia     Hyperlipidemia     Polyp of colon     Prediabetes     Prolapsed lumbosacral intervertebral disc     Rheumatoid arthritis, unspecified 2024    DR [facility] - RHEUM    Rheumatoid factor positive     Shoulder joint pain     rt      Social History     Socioeconomic History    Marital status:    Tobacco Use    Smoking status: Every Day     Current packs/day: 1.00     Average packs/day: 1 pack/day for 50.0 years (50.0 ttl pk-yrs)     Types: Cigarettes    Smokeless tobacco: Never   Substance and Sexual Activity    Alcohol use: Yes    Drug use: Never      No family history on file.     ROS:   Review of Systems   Constitutional:  Negative for appetite change, chills, fatigue and fever.   HENT:  Negative for ear discharge, ear pain, hearing loss, sinus pressure, sinus pain and tinnitus.    Eyes:  Negative for visual disturbance.   Respiratory:  Negative for cough, chest  tightness, shortness of breath and wheezing.    Cardiovascular:  Negative for chest pain.   Gastrointestinal:  Positive for constipation. Negative for abdominal pain, diarrhea, nausea and vomiting.   Endocrine: Negative for polydipsia, polyphagia and polyuria.   Genitourinary:  Negative for difficulty urinating, dysuria, flank pain and hematuria.   Musculoskeletal:  Positive for arthralgias and joint swelling. Negative for back pain and neck pain.   Skin:  Negative for rash.   Neurological:  Negative for dizziness, tremors, syncope, facial asymmetry, speech difficulty, weakness, numbness and headaches.   Hematological:  Negative for adenopathy. Does not bruise/bleed easily.   Psychiatric/Behavioral:  Negative for confusion, dysphoric mood, hallucinations, sleep disturbance and suicidal ideas.      Objective:   Physical Exam  Vitals and nursing note reviewed.   Constitutional:       General: He is not in acute distress.     Appearance: Normal appearance. He is well-developed. He is not ill-appearing.   HENT:      Head: Normocephalic. Contusion present. No raccoon eyes, Amaya's sign, abrasion, masses, right periorbital erythema, left periorbital erythema or laceration.      Jaw: There is normal jaw occlusion.        Right Ear: Hearing and external ear normal. No tenderness. No middle ear effusion. A PE tube is present. Tympanic membrane is not injected, scarred, perforated or erythematous.      Left Ear: Hearing and external ear normal. There is impacted cerumen. Tympanic membrane is not injected, scarred, perforated or erythematous.      Nose: Nose normal.      Mouth/Throat:      Mouth: Mucous membranes are moist.      Pharynx: No oropharyngeal exudate.   Eyes:      General: No scleral icterus.     Pupils: Pupils are equal, round, and reactive to light.   Neck:      Vascular: No carotid bruit.   Cardiovascular:      Rate and Rhythm: Normal rate and regular rhythm.      Pulses: Normal pulses.      Heart sounds:  Normal heart sounds. No murmur heard.     No friction rub. No gallop.   Pulmonary:      Effort: Pulmonary effort is normal.      Breath sounds: Normal breath sounds. No wheezing, rhonchi or rales.   Abdominal:      General: Abdomen is flat. Bowel sounds are normal.      Palpations: Abdomen is soft.      Tenderness: There is no abdominal tenderness. There is no right CVA tenderness, left CVA tenderness or rebound.   Musculoskeletal:         General: Normal range of motion.      Right wrist: Normal.      Left wrist: Normal.      Right hand: Swelling present. No deformity or bony tenderness. Decreased strength. Normal sensation. There is no disruption of two-point discrimination. Normal capillary refill. Normal pulse.      Left hand: Swelling present. No deformity or bony tenderness. Decreased strength. Normal sensation. There is no disruption of two-point discrimination. Normal capillary refill. Normal pulse.        Hands:       Cervical back: Normal range of motion and neck supple. No tenderness.      Right lower leg: No edema.      Left lower leg: No edema.   Lymphadenopathy:      Cervical: No cervical adenopathy.   Skin:     General: Skin is warm and dry.      Capillary Refill: Capillary refill takes less than 2 seconds.      Coloration: Skin is not jaundiced.      Findings: Abrasion and ecchymosis present. No rash.          Neurological:      General: No focal deficit present.      Mental Status: He is alert and oriented to person, place, and time. Mental status is at baseline.      Motor: No weakness.      Gait: Gait normal.   Psychiatric:         Mood and Affect: Mood normal.         Behavior: Behavior normal.         Thought Content: Thought content normal.         Judgment: Judgment normal.       Assessment:     1. Preventative health care    2. Adenocarcinoma of prostate    3. Rheumatoid arthritis involving both hands with positive rheumatoid factor    4. Statin myopathy    5. Long term use of drug    6.  Screening for diabetes mellitus    7. Encounter for hepatitis C virus screening test for high risk patient    8. Malignant neoplasm of thyroid gland    9. H/O thyroidectomy    10. Postoperative hypothyroidism    11. Lymphocytopenia    12. Rheumatoid factor positive    13. Constipation, unspecified constipation type    14. S/P tympanic tube insertion    15. Refused influenza vaccine    16. Refused pneumococcal vaccination    17. Impacted cerumen of right ear      No images are attached to the encounter.   Plan:     Problem List Items Addressed This Visit          ENT    S/P tympanic tube insertion    Relevant Orders    Hepatitis C antibody    Vitamin D    Urinalysis, Reflex to Urine Culture Urine, Clean Catch    TSH+Free T4    Lipid Panel    Hemoglobin A1C    Comprehensive Metabolic Panel    CBC Auto Differential       Immunology/Multi System    Rheumatoid factor positive    Current Assessment & Plan     Followed by Dr. Adair. Controlled. Will continue to monitor.  Continue current meds and f/u in 6 months. RTC sooner if symptoms persist.           Relevant Orders    Hepatitis C antibody    Vitamin D    Urinalysis, Reflex to Urine Culture Urine, Clean Catch    TSH+Free T4    Lipid Panel    Hemoglobin A1C    Comprehensive Metabolic Panel    CBC Auto Differential    Rheumatoid arthritis involving both hands with positive rheumatoid factor    Overview       IGA > 214  Anti- CCP IgG > 340    Anti-Carbamylated Protein IgG Positive (22.53)          Relevant Orders    Hepatitis C antibody    Vitamin D    Urinalysis, Reflex to Urine Culture Urine, Clean Catch    TSH+Free T4    Lipid Panel    Hemoglobin A1C    Comprehensive Metabolic Panel    CBC Auto Differential       Oncology    Adenocarcinoma of prostate    Overview     T2 high volume [place] 4+3 equals 7 adenocarcinoma.     Followed by Dr. Miranda.          Current Assessment & Plan       - patient initially went to see Dr. Cosme Miranda for curvature of the  penis  - patient was diagnosed with prostate cancer in 2019 and subsequently underwent prostatectomy; PSA post was 0.02, which then was reported as < 0.064  - pathologic staging was pT3aN0, [place] 7  - he then underwent radiation with Dr. Garcia  - PSA on 6/7/2022 was 0.205; most recent PSA on 9/20/2022 was 0.216  - patient had a PET scan on 8/2/2022 which showed a small lymph node along the right internal iliac chain measuring 3-4 mm with SUV of 6; there was also a small focus of uptake in the right iliac bone with no definite CT correlate  - more recent CT scan of the pelvis on 9/14/2022 showed subtle sclerotic lesions of the bilateral femoral head/neck, likely bone islands       1/14/25    Followed by Dr. Miranda. PET Scan negative. Bone scan negative. Has CT abd/pelvis upcoming.            Relevant Orders    Hepatitis C antibody    Vitamin D    Urinalysis, Reflex to Urine Culture Urine, Clean Catch    TSH+Free T4    Lipid Panel    Hemoglobin A1C    Comprehensive Metabolic Panel    CBC Auto Differential    Lymphocytopenia    Current Assessment & Plan       Macrocytic anemia improved while he was on L-methylfolate 7.5 mg. no longer taking this medication.         WBC/lymphocytes continue to be low... have been since 2020.            PLAN     Followed by Dr. Royal.         Relevant Orders    Hepatitis C antibody    Vitamin D    Urinalysis, Reflex to Urine Culture Urine, Clean Catch    TSH+Free T4    Lipid Panel    Hemoglobin A1C    Comprehensive Metabolic Panel    CBC Auto Differential       Endocrine    Postoperative hypothyroidism    Relevant Orders    Hepatitis C antibody    Vitamin D    Urinalysis, Reflex to Urine Culture Urine, Clean Catch    TSH+Free T4    Lipid Panel    Hemoglobin A1C    Comprehensive Metabolic Panel    CBC Auto Differential       GI    Constipation    Current Assessment & Plan     PT given sample of Linzess 72 mcg and Linzess 145 mcg with instruction to start w/ 72.  Advised to take the med on  empty stomach. Do not eat for 1 hour. Can titrate up as needed after 3-4 days of taking the med.          Relevant Orders    Hepatitis C antibody    Vitamin D    Urinalysis, Reflex to Urine Culture Urine, Clean Catch    TSH+Free T4    Lipid Panel    Hemoglobin A1C    Comprehensive Metabolic Panel    CBC Auto Differential       Orthopedic    Statin myopathy    Relevant Orders    Hepatitis C antibody    Vitamin D    Urinalysis, Reflex to Urine Culture Urine, Clean Catch    TSH+Free T4    Lipid Panel    Hemoglobin A1C    Comprehensive Metabolic Panel    CBC Auto Differential     Other Visit Diagnoses       Preventative health care    -  Primary    Relevant Orders    Hepatitis C antibody    Vitamin D    Urinalysis, Reflex to Urine Culture Urine, Clean Catch    TSH+Free T4    Lipid Panel    Hemoglobin A1C    Comprehensive Metabolic Panel    CBC Auto Differential    Long term use of drug        Relevant Orders    Hepatitis C antibody    Vitamin D    Urinalysis, Reflex to Urine Culture Urine, Clean Catch    TSH+Free T4    Lipid Panel    Hemoglobin A1C    Comprehensive Metabolic Panel    CBC Auto Differential    Screening for diabetes mellitus        Relevant Orders    Hepatitis C antibody    Vitamin D    Urinalysis, Reflex to Urine Culture Urine, Clean Catch    TSH+Free T4    Lipid Panel    Hemoglobin A1C    Comprehensive Metabolic Panel    CBC Auto Differential    Encounter for hepatitis C virus screening test for high risk patient        Relevant Orders    Hepatitis C antibody    Vitamin D    Urinalysis, Reflex to Urine Culture Urine, Clean Catch    TSH+Free T4    Lipid Panel    Hemoglobin A1C    Comprehensive Metabolic Panel    CBC Auto Differential    Malignant neoplasm of thyroid gland        Relevant Orders    Hepatitis C antibody    Vitamin D    Urinalysis, Reflex to Urine Culture Urine, Clean Catch    TSH+Free T4    Lipid Panel    Hemoglobin A1C    Comprehensive Metabolic Panel    CBC Auto Differential    Refused  "influenza vaccine        Refused pneumococcal vaccination        Impacted cerumen of right ear        Relevant Orders    Ear Cerumen Removal          Ear Cerumen Removal    Date/Time: 1/14/2025 8:00 AM    Performed by: Skyler Francis NP  Authorized by: Skyler Francis NP    Time out: Immediately prior to procedure a "time out" was called to verify the correct patient, procedure, equipment, support staff and site/side marked as required.    Consent Done?:  Yes (Verbal)    Local anesthetic:  None  Location details:  Right ear  Procedure type: irrigation    Cerumen  Removal Results:  Cerumen completely removed  Patient tolerance:  Patient tolerated the procedure well with no immediate complications       No visits with results within 1 Month(s) from this visit.   Latest known visit with results is:   Office Visit on 03/07/2024   Component Date Value Ref Range Status    WBC 03/07/2024 2.16 (L)  4.3 - 10.8 X 10 3/ul Final    RBC 03/07/2024 4.32 (L)  4.7 - 6.1 X 10 6/ul Final    RDW-SD 03/07/2024 50.9  37 - 54 fl Final    Hemoglobin 03/07/2024 14.2  14 - 18 g/dL Final    Hematocrit 03/07/2024 42.6  42 - 52 % Final    MCV 03/07/2024 98.6 (H)  80 - 94 fl Final    MCH 03/07/2024 32.9 (H)  27 - 32 pg Final    MCHC 03/07/2024 33.3  32 - 36 g/dL Final    Platelets 03/07/2024 143  135 - 400 X 10 3/ul Final    nRBC# 03/07/2024 0.0  0 - 0.2 /100 WBC Final    nRBC Count Absolute 03/07/2024 0.000  0 - 0.012 x 10 3/ul Final    Comment: NOTE  Testing performed at:  [facility], [address] [place] [address]  [address] CLIA #:[id number]      Glucose 03/07/2024 105  82 - 115 mg/dL Final    BUN 03/07/2024 17.1  8 - 23 mg/dL Final    Creatinine 03/07/2024 0.79  0.70 - 1.20 mg/dL Final    AST 03/07/2024 23  0 - 40 U/L Final    ALT (SGPT) 03/07/2024 19  0 - 41 U/L Final    Alkaline Phosphatase 03/07/2024 87  40 - 130 U/L Final    Calcium 03/07/2024 8.8  8.6 - 10.2 mg/dL Final    Protein, Total 03/07/2024 7.2  6.4 - 8.3 g/dL " Final    Albumin 03/07/2024 3.7  3.5 - 5.2 g/dL Final    BILIRUBIN, TOTAL 03/07/2024 0.48  0.00 - 1.20 mg/dL Final    Sodium 03/07/2024 142  136 - 145 mmol/L Final    Potassium 03/07/2024 4.5  3.5 - 5.1 mmol/L Final    Chloride 03/07/2024 106  98 - 107 mmol/L Final    CO2 03/07/2024 26  22 - 29 mmol/L Final    Globulin 03/07/2024 3.5  1.5 - 4.5 g/dL Final    Albumin/Globulin Ratio 03/07/2024 1.1  1.0 - 2.7 Final    BUN/Creatinine Ratio 03/07/2024 21.6 (H)  6 - 20 Final    GFR ESTIMATION 03/07/2024 92.64  >60.00 mL/min/1.73m2 Final    Anion Gap 03/07/2024 10.0  8.0 - 17.0 mmol/L Final    Comment: NOTE  Testing performed at:  [facility], [address] [place] [address] CLIA #:[id number]      [facility] 03/07/2024 28 (H)  0 - 20 mm/hr Final    Comment: NOTE  Testing performed at:  [facility], [address] [place] [address]  [address] CLIA #:[id number]      CRP QUANTITATIVE 03/07/2024 17.4 (H)  <5.0 mg/L Final    CRP QUALITATIVE 03/07/2024 POSITIVE  mg/L Final    Comment: NOTE  Testing performed at:  [facility], [address] [place] [address]  [address] CLIA #:[id number]      Additional Testing 03/07/2024 SEE BELOW   Final    Comment: Additional testing was collected and sent to a reference lab. Results  may take 7 days to 2 weeks before they are final. Reports will be sent  to ordering client via fax, pathviewer, mail, interface or   delivered.  NOTE  Testing performed at:  [facility], [address] [place], [place] [id number] CLIA #:[id number]      IgG 03/07/2024 1,115  700 - 1,600 mg/dL Final    IgA 03/07/2024 387  70 - 400 mg/dL Final    IgM 03/07/2024 54  40 - 230 mg/dL Final    Comment: NOTE  Testing performed at:  [facility], [address] [place] [address]  [address] CLIA #:[id number]      GÉNESIS Paraprotein, CSF 03/07/2024 DETECTED (A)  NOT DETECTED Final    Heavy chain components of IgD and IgE not tested.    GÉNESIS Interpretation:U  03/07/2024 See Comment:   Final    Comment: GÉNESIS paraprotein detected. Data suggests monoclonal gammopathy type  IgG Lambda.  NOTE  Testing performed at:  [facility], [address] [place] [address] CLIA #:[id number]      Total IgE 03/07/2024 377 (H)  0 - 100 IU/mL Final    Comment: NOTE  Testing performed at:  [facility], [address] [place], [place] [id number] CLIA #:[id number]      Albumin, Electrophoresis 03/07/2024 3.21  3.2 - 5.3 g/dL Final    Albumin/Globulin Ratio 03/07/2024 0.92 (L)  1.0 - 2.7 g/dL Final    Alpha 1 03/07/2024 0.32  0.1 - 0.4 g/dL Final    Alpha 2 03/07/2024 0.81  0.6 - 1 g/dL Final    BETA 03/07/2024 1.17  0.6 - 1.3 g/dL Final    GAMMA 03/07/2024 1.18  0.7 - 1.5 g/dL Final    RESTRICTED BAND 03/07/2024 0.24 (H)  0 g/dL Final    Pepsin A Interpretation 03/07/2024 See Comment:   Final    Comment: There is a paraprotein present in the gamma region of the serum  protein electrophoresis.  Interpretation by Clint Rogers MD  Authorized Signature  NOTE  Testing performed at:  [facility], [address] [place], [place] [id number] CLIA #:[id number]      Uric Acid 03/07/2024 4.9  3.4 - 7.0 mg/dL Final    Comment: NOTE  Testing performed at:  [facility], [address] [place] [address]  [address] CLIA #:[id number]      SEGMENTERS 03/07/2024 68  34 - 71 % Final    Bands 03/07/2024 1  1 - 5 % Final    Lymphocytes 03/07/2024 26  19 - 53 % Final    Monocytes 03/07/2024 2 (L)  5 - 13 % Final    Eosinophils 03/07/2024 3  1 - 7 % Final    Neutrophils Absolute 03/07/2024 1.49 (L)  2.32 - 6.70 X 10 3/ul Final    Lymphocytes Absolute 03/07/2024 0.56 (L)  0.86 - 4.75 X 10 3/ul Final    ABSOLUTE MONOCYTE 03/07/2024 0.04 (L)  0.22 - 1.08 X 10 3/ul Final    ABSOLUTE EOSINOPHIL 03/07/2024 0.06  0.04 - 0.54 X 10 3/ul Final    ABSOLUTE BASOPHIL 03/07/2024 0.00  0.00 - 0.22 X 10 3/ul Final    RBC & PLT MORPHOLOGY 03/07/2024    Final    Comment: 1+ ANISO  GIANT  PLATELETS NOTED      Total Granulocytes 03/07/2024 1.56  X 10 3/UL Final    Comment: NOTE  Testing performed at:  [facility], [address] [place]  [address] CLIA #:[id number]          No results found in the last 30 days.       Duration of encounter:  minutes  This includes face-to-face time and non face-to-face time preparing to see the patient (eg, review of tests), obtaining and/or reviewing separately obtained history, documenting clinical information in the electronic or other health record, independently interpreting resultsand communicating results to the patient/family/caregiver, or care coordination      DISCLAIMER: This note was prepared with [employer] voice recognition transcription software. Garbled syntax, mangled pronouns, and other bizarre constructions may be attributed to that software system.     All diagnostic data (labs/imaging) was reviewed with the patient and/or family member in the room. All preventative measures (vaccinations, screenings, testing, imaging) were discussed in depth and offered to the patient in accordance with current medical guidelines to ensure the patient is up to date.  All questions were answered to their liking. The patient and/or family member voiced understanding of all instructions provided. Expectations regarding follow up and treatment plan were voiced and confirmed prior to departure. The patient was given orders/instructions at the end of the visit for reference. They were instructed to notify my office if they have not been contacted for imaging/referrals/labs/results in 1-2 weeks. They voiced understanding of all of the above.     Follow up:     Patient Instructions     What do I need to tell my doctor BEFORE I take this drug?   If you are allergic to this drug; any part of this drug; or any other drugs, foods, or substances. Tell your doctor about the allergy and what signs you had.  If you have a bowel block.  This is not a list of all drugs  or health problems that interact with this drug.  Tell your doctor and pharmacist about all of your drugs (prescription or OTC, natural products, vitamins) and health problems. You must check to make sure that it is safe for you to take this drug with all of your drugs and health problems. Do not start, stop, or change the dose of any drug without checking with your doctor.  What are some things I need to know or do while I take this drug?   Tell all of your health care providers that you take this drug. This includes your doctors, nurses, pharmacists, and dentists.  If you get diarrhea, you will need to make sure to avoid getting dehydrated. Drink plenty of fluids and watch for weight loss. Talk with your doctor.  Keep this drug away from children. Children who take this drug by accident, especially children younger than 2 years of age, may have severe side effects, like severe diarrhea and dehydration. If a child takes this drug by accident, get medical help right away.  Tell your doctor if you are pregnant, plan on getting pregnant, or are breast-feeding. You will need to talk about the benefits and risks to you and the baby.     What are some side effects that I need to call my doctor about right away?   WARNING/CAUTION: Even though it may be rare, some people may have very bad and sometimes deadly side effects when taking a drug. Tell your doctor or get medical help right away if you have any of the following signs or symptoms that may be related to a very bad side effect:  Signs of an allergic reaction, like rash; hives; itching; red, swollen, blistered, or peeling skin with or without fever; wheezing; tightness in the chest or throat; trouble breathing, swallowing, or talking; unusual hoarseness; or swelling of the mouth, face, lips, tongue, or throat.  Black, tarry, or bloody stools.  Swelling of belly.  Bloating.  Sometimes, very bad diarrhea has led to the need to go to the hospital. Call your doctor right  away if you have very bad diarrhea or diarrhea that will not go away. Call your doctor right away if you have signs of dehydration like very bad dizziness or passing out, not able to pass urine or change in how much urine is passed, or feeling very tired.  What are some other side effects of this drug?   All drugs may cause side effects. However, many people have no side effects or only have minor side effects. Call your doctor or get medical help if any of these side effects or any other side effects bother you or do not go away:  Stomach pain or diarrhea.  Gas.  Signs of a common cold.  These are not all of the side effects that may occur. If you have questions about side effects, call your doctor. Call your doctor for medical advice about side effects.  You may report side effects to your national health agency.  You may report side effects to the FDA at 1-287.903.6264. You may also report side effects at https://www.fda.gov/medwatch.  How is this drug best taken?   Use this drug as ordered by your doctor. Read all information given to you. Follow all instructions closely.  Take on an empty stomach. Take at least 30 minutes before the first meal of the day.  Swallow capsule whole. Do not chew, break, or crush.  If you have trouble swallowing, you may mix this drug with applesauce or water. Follow how to mix as you have been told or read the package insert. If you are not sure how to mix this drug, call your doctor or pharmacist.  If mixed, swallow the mixed drug right away. Do not store for use at a later time.  Do not chew the mixture.  Those who have feeding tubes may use this drug. Use as you have been told. Flush the feeding tube after this drug is given.  What do I do if I miss a dose?   Skip the missed dose and go back to your normal time.  Do not take 2 doses at the same time or extra doses.     How do I store and/or throw out this drug?   Store at room temperature in a dry place. Do not store in a  bathroom.  Store in the original container. Do not take out the antimoisture cube or packet.  Keep lid tightly closed.  Keep all drugs in a safe place. Keep all drugs out of the reach of children and pets.  Throw away unused or  drugs. Do not flush down a toilet or pour down a drain unless you are told to do so. Check with your pharmacist if you have questions about the best way to throw out drugs. There may be drug take-back programs in your area.     General drug facts   If your symptoms or health problems do not get better or if they become worse, call your doctor.  Do not share your drugs with others and do not take anyone else's drugs.  Some drugs may have another patient information leaflet. If you have any questions about this drug, please talk with your doctor, nurse, pharmacist, or other health care provider.  This drug comes with an extra patient fact sheet called a Medication Guide. Read it with care. Read it again each time this drug is refilled. If you have any questions about this drug, please talk with the doctor, pharmacist, or other health care provider.  If you think there has been an overdose, call your poison control center or get medical care right away. Be ready to tell or show what was taken, how much, and when it happened.     Consumer Information Use and Disclaimer   This generalized information is a limited summary of diagnosis, treatment, and/or medication information. It is not meant to be comprehensive and should be used as a tool to help the user understand and/or assess potential diagnostic and treatment options. It does NOT include all information about conditions, treatments, medications, side effects, or risks that may apply to a specific patient. It is not intended to be medical advice or a substitute for the medical advice, diagnosis, or treatment of a health care provider based on the health care provider's examination and assessment of a patient's specific and unique  circumstances. Patients must speak with a health care provider for complete information about their health, medical questions, and treatment options, including any risks or benefits regarding use of medications. This information does not endorse any treatments or medications as safe, effective, or approved for treating a specific patient. [employer] and its affiliates disclaim any warranty or liability relating to this information or the use thereof. The use of this information is governed by the Terms of Use, available at https://www.[employer].Viyet/en/solutions/lexicomp/about/renee.  Last Reviewed Date   2021-09-08  Copyright   © 2021 [employer] and its affiliates and/or licensors. All rights reserved.     Follow up in about 6 months (around 7/14/2025), or if symptoms worsen or fail to improve.

## 2025-03-04 RX ORDER — LEVOTHYROXINE SODIUM 137 UG/1
137 TABLET ORAL
Qty: 90 TABLET | Refills: 0 | Status: SHIPPED | OUTPATIENT
Start: 2025-03-04 | End: 2025-03-04 | Stop reason: SDUPTHER

## 2025-03-04 RX ORDER — LEVOTHYROXINE SODIUM 137 UG/1
137 TABLET ORAL
Qty: 90 TABLET | Refills: 0 | Status: SHIPPED | OUTPATIENT
Start: 2025-03-04

## 2025-03-04 RX ORDER — LEVOTHYROXINE SODIUM 137 UG/1
137 TABLET ORAL
COMMUNITY
End: 2025-03-04 | Stop reason: SDUPTHER

## 2025-03-04 NOTE — TELEPHONE ENCOUNTER
----- Message from Nancy sent at 3/4/2025  9:16 AM CST -----  Regarding: med refill  Pt needs a refill of Levothryroxin 137 mcg tab 90 day 1 a day take in morning sent to "[facility] in [place] . Has appt with Abdifatah in May when he returns from leave

## 2025-03-28 ENCOUNTER — PATIENT MESSAGE (OUTPATIENT)
Facility: CLINIC | Age: 75
End: 2025-03-28
Payer: MEDICARE

## 2025-05-08 ENCOUNTER — OFFICE VISIT (OUTPATIENT)
Dept: FAMILY MEDICINE | Facility: CLINIC | Age: 75
End: 2025-05-08
Payer: MEDICARE

## 2025-05-08 VITALS
HEART RATE: 66 BPM | WEIGHT: 218 LBS | SYSTOLIC BLOOD PRESSURE: 134 MMHG | DIASTOLIC BLOOD PRESSURE: 70 MMHG | BODY MASS INDEX: 30.4 KG/M2 | OXYGEN SATURATION: 96 %

## 2025-05-08 DIAGNOSIS — E89.0 POSTOPERATIVE HYPOTHYROIDISM: Primary | ICD-10-CM

## 2025-05-08 DIAGNOSIS — E78.5 HYPERLIPIDEMIA, UNSPECIFIED HYPERLIPIDEMIA TYPE: ICD-10-CM

## 2025-05-08 DIAGNOSIS — Z53.20 REFUSAL OF STATIN MEDICATION BY PATIENT: ICD-10-CM

## 2025-05-08 DIAGNOSIS — Z71.2 ENCOUNTER TO DISCUSS TEST RESULTS: ICD-10-CM

## 2025-05-08 DIAGNOSIS — R73.03 PREDIABETES: ICD-10-CM

## 2025-05-08 DIAGNOSIS — E55.9 VITAMIN D DEFICIENCY: ICD-10-CM

## 2025-05-08 PROCEDURE — 99213 OFFICE O/P EST LOW 20 MIN: CPT | Mod: S$GLB,,, | Performed by: NURSE PRACTITIONER

## 2025-05-08 RX ORDER — LEVOTHYROXINE SODIUM 137 UG/1
137 TABLET ORAL
Qty: 90 TABLET | Refills: 1 | Status: SHIPPED | OUTPATIENT
Start: 2025-05-08

## 2025-05-08 NOTE — PROGRESS NOTES
Subjective:      Patient ID: Mike Rome is a 74 y.o. male.    Chief Complaint: Medication Refill (SYNTHROID 90 DAY TO [facility].)      74-year-old male with a history of hyperlipidemia, ED, rheumatoid factor positive, adenocarcinoma prostate, macrocytic anemia with vitamin B12 deficiency, prediabetes, cholelithiasis, chronic right flank pain, colon polyp    status post total thyroidectomy that took place July 16, 2024. Here for 6-month return to clinic. 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 He has been following with Dr. Sultana as well considering radioactive iodine. He reports hot flashes. No other new symptoms. He is currently on 150 mg of Synthroid daily but does have elevated labs.      Recently diagnosed with rheumatoid arthritis.  Followed by Dr. Adair.  AVISE CTD showed RF , RF IGA > 214, Anti- CCP IgG > 340, and Anti-Carbamylated Protein IgG Positive (22.53). Denies fever, chills, body aches, rash, lymphadenopathy. No longer on prednisone. Currently on monthly infusion. Unknown name.       MGUS followed by Dr. Royal   - patient initially went to see Dr. Cosme Miranda for curvature of the penis  - patient was diagnosed with prostate cancer in 2019 and subsequently underwent prostatectomy; PSA post was 0.02, which then was reported as < 0.064  - pathologic staging was pT3aN0, Mcclellan 7  - he then underwent radiation with Dr. Garcia  - PSA on 6/7/2022 was 0.205; most recent PSA on 9/20/2022 was 0.216  - patient had a PET scan on 8/2/2022 which showed a small lymph node along the right internal iliac chain measuring 3-4 mm with SUV of 6; there was also a small focus of uptake in the right iliac bone with no definite CT correlate  - more recent CT scan of the pelvis on 9/14/2022 showed subtle sclerotic lesions of the bilateral femoral head/neck, likely bone islands           The patient is here today for his 6 month follow up.   He refuses all  vaccination.  Refuses low-dose CT of the chest.  He gets this done through his hematologist.           No other issues reported                 Past Medical History:   Diagnosis Date    Abnormal prostate specific antigen (PSA)     Cholelithiasis without obstruction     Erectile dysfunction     Gallstone     Hyperglycemia     Hyperlipidemia     Polyp of colon     Prediabetes     Prolapsed lumbosacral intervertebral disc     Rheumatoid arthritis, unspecified 2024    DR [facility] - RHEUM    Rheumatoid factor positive     Shoulder joint pain     rt      Social History     Socioeconomic History    Marital status:    Tobacco Use    Smoking status: Every Day     Current packs/day: 1.00     Average packs/day: 1 pack/day for 50.0 years (50.0 ttl pk-yrs)     Types: Cigarettes    Smokeless tobacco: Never   Substance and Sexual Activity    Alcohol use: Yes    Drug use: Never     Social Drivers of Health     Financial Resource Strain: Low Risk  (3/26/2025)    Received from [employer] [facility] and Its Subsidiaries and Affiliates    Overall Financial Resource Strain (CARDIA)     Difficulty of Paying Living Expenses: Not hard at all   Food Insecurity: No Food Insecurity (3/26/2025)    Received from [employer] [facility] and Its Subsidiaries and Affiliates    Hunger Vital Sign     Worried About Running Out of Food in the Last Year: Never true     Ran Out of Food in the Last Year: Never true   Transportation Needs: No Transportation Needs (3/26/2025)    Received from [employer] [facility] and Its Subsidiaries and Affiliates    PRAPARE - Transportation     Lack of Transportation (Medical): No     Lack of Transportation (Non-Medical): No   Physical Activity: Inactive (3/26/2025)    Received from [employer] [facility] and Its Subsidiaries and Affiliates    Exercise Vital Sign     Days of Exercise per Week: 0 days      Minutes of Exercise per Session: 0 min   Stress: No Stress Concern Present (3/26/2025)    Received from [facility] and Its Subsidiaries and Affiliates    [employer] [place] [employer] - Occupational Stress Questionnaire     Feeling of Stress : Not at all   Housing Stability: Unknown (3/26/2025)    Received from [facility] and Its Subsidiaries and Affiliates    Housing Stability Vital Sign     Unable to Pay for Housing in the Last Year: No     Homeless in the Last Year: No      No family history on file.     ROS:   Review of Systems   Constitutional:  Negative for appetite change, chills, fatigue and fever.   HENT:  Negative for ear discharge, ear pain, hearing loss, sinus pressure, sinus pain and tinnitus.    Eyes:  Negative for visual disturbance.   Respiratory:  Negative for cough, chest tightness, shortness of breath and wheezing.    Cardiovascular:  Negative for chest pain.   Gastrointestinal:  Negative for abdominal pain, constipation, diarrhea, nausea and vomiting.   Endocrine: Negative for polydipsia, polyphagia and polyuria.   Genitourinary:  Negative for difficulty urinating, dysuria, flank pain and hematuria.   Musculoskeletal:  Positive for arthralgias and joint swelling. Negative for back pain and neck pain.   Skin:  Negative for rash.   Neurological:  Negative for dizziness, tremors, syncope, facial asymmetry, speech difficulty, weakness, numbness and headaches.   Hematological:  Negative for adenopathy. Does not bruise/bleed easily.   Psychiatric/Behavioral:  Negative for confusion, dysphoric mood, hallucinations, sleep disturbance and suicidal ideas.      Objective:   Physical Exam  Vitals and nursing note reviewed.   Constitutional:       General: He is not in acute distress.     Appearance: Normal appearance. He is well-developed. He is not ill-appearing.   HENT:      Head: Normocephalic and atraumatic.   Eyes:       General: No scleral icterus.     Pupils: Pupils are equal, round, and reactive to light.   Neck:      Vascular: No carotid bruit.   Cardiovascular:      Rate and Rhythm: Normal rate and regular rhythm.      Pulses: Normal pulses.   Pulmonary:      Effort: Pulmonary effort is normal.      Breath sounds: Normal breath sounds.   Abdominal:      General: Abdomen is flat.   Musculoskeletal:      Right wrist: Normal.      Left wrist: Normal.      Right hand: Swelling present. No deformity or bony tenderness. Decreased range of motion. Decreased strength. Normal sensation. There is no disruption of two-point discrimination. Normal capillary refill. Normal pulse.      Left hand: Swelling present. No deformity or bony tenderness. Decreased range of motion. Decreased strength. Normal sensation. There is no disruption of two-point discrimination. Normal capillary refill. Normal pulse.        Hands:       Cervical back: Normal range of motion.      Right lower leg: No edema.      Left lower leg: No edema.   Skin:     General: Skin is warm and dry.      Coloration: Skin is not jaundiced.      Findings: No abrasion, ecchymosis or rash.   Neurological:      Mental Status: He is alert and oriented to person, place, and time. Mental status is at baseline.   Psychiatric:         Mood and Affect: Mood normal.         Behavior: Behavior normal.         Thought Content: Thought content normal.         Judgment: Judgment normal.       Assessment:     1. Postoperative hypothyroidism    2. Hyperlipidemia, unspecified hyperlipidemia type    3. Prediabetes    4. Vitamin D deficiency    5. Refusal of statin medication by patient    6. Encounter to discuss test results      No images are attached to the encounter.   Plan:     Problem List Items Addressed This Visit          Cardiac/Vascular    Hyperlipidemia    Current Assessment & Plan     Recommend Coq10 100 mg daily in addition to omega-3 fish oil.  He refuses statins.              Endocrine    Postoperative hypothyroidism - Primary    Current Assessment & Plan     Currently on Synthroid.  Requesting refill.         Relevant Medications    levothyroxine (SYNTHROID) 137 MCG Tab tablet     Other Visit Diagnoses         Prediabetes        REcommended diet modifications and exercise.      Vitamin D deficiency        Recommended D3 2000units.      Refusal of statin medication by patient          Encounter to discuss test results              Procedures     No visits with results within 1 Month(s) from this visit.   Latest known visit with results is:   Office Visit on 03/07/2024   Component Date Value Ref Range Status    WBC 03/07/2024 2.16 (L)  4.3 - 10.8 X 10 3/ul Final    RBC 03/07/2024 4.32 (L)  4.7 - 6.1 X 10 6/ul Final    RDW-SD 03/07/2024 50.9  37 - 54 fl Final    Hemoglobin 03/07/2024 14.2  14 - 18 g/dL Final    Hematocrit 03/07/2024 42.6  42 - 52 % Final    MCV 03/07/2024 98.6 (H)  80 - 94 fl Final    MCH 03/07/2024 32.9 (H)  27 - 32 pg Final    MCHC 03/07/2024 33.3  32 - 36 g/dL Final    Platelets 03/07/2024 143  135 - 400 X 10 3/ul Final    nRBC# 03/07/2024 0.0  0 - 0.2 /100 WBC Final    nRBC Count Absolute 03/07/2024 0.000  0 - 0.012 x 10 3/ul Final    Comment: NOTE  Testing performed at:  [facility], [address] [place] [address] CLIA #:[id number]      Glucose 03/07/2024 105  82 - 115 mg/dL Final    BUN 03/07/2024 17.1  8 - 23 mg/dL Final    Creatinine 03/07/2024 0.79  0.70 - 1.20 mg/dL Final    AST 03/07/2024 23  0 - 40 U/L Final    ALT (SGPT) 03/07/2024 19  0 - 41 U/L Final    Alkaline Phosphatase 03/07/2024 87  40 - 130 U/L Final    Calcium 03/07/2024 8.8  8.6 - 10.2 mg/dL Final    Protein, Total 03/07/2024 7.2  6.4 - 8.3 g/dL Final    Albumin 03/07/2024 3.7  3.5 - 5.2 g/dL Final    BILIRUBIN, TOTAL 03/07/2024 0.48  0.00 - 1.20 mg/dL Final    Sodium 03/07/2024 142  136 - 145 mmol/L Final    Potassium 03/07/2024 4.5  3.5 - 5.1 mmol/L Final    Chloride  03/07/2024 106  98 - 107 mmol/L Final    CO2 03/07/2024 26  22 - 29 mmol/L Final    Globulin 03/07/2024 3.5  1.5 - 4.5 g/dL Final    Albumin/Globulin Ratio 03/07/2024 1.1  1.0 - 2.7 Final    BUN/Creatinine Ratio 03/07/2024 21.6 (H)  6 - 20 Final    GFR ESTIMATION 03/07/2024 92.64  >60.00 mL/min/1.73m2 Final    Anion Gap 03/07/2024 10.0  8.0 - 17.0 mmol/L Final    Comment: NOTE  Testing performed at:  [facility], [address] [place] [address] CLIA #:[id number]      SED RATE (WESTERGREN) 03/07/2024 28 (H)  0 - 20 mm/hr Final    Comment: NOTE  Testing performed at:  [facility], [address] [place], [place] [id number] CLIA #:[id number]      CRP QUANTITATIVE 03/07/2024 17.4 (H)  <5.0 mg/L Final    CRP QUALITATIVE 03/07/2024 POSITIVE  mg/L Final    Comment: NOTE  Testing performed at:  [facility], [address] [place] [address] CLIA #:[id number]      Additional Testing 03/07/2024 SEE BELOW   Final    Comment: Additional testing was collected and sent to a reference lab. Results  may take 7 days to 2 weeks before they are final. Reports will be sent  to ordering client via fax, pathviewer, mail, interface or   delivered.  NOTE  Testing performed at:  [facility], [address] [place] [address]  [address] CLIA #:[id number]      IgG 03/07/2024 1,115  700 - 1,600 mg/dL Final    IgA 03/07/2024 387  70 - 400 mg/dL Final    IgM 03/07/2024 54  40 - 230 mg/dL Final    Comment: NOTE  Testing performed at:  [facility], [address] [place] [address]  [address] CLIA #:[id number]      GÉNESIS Paraprotein, CSF 03/07/2024 DETECTED (A)  NOT DETECTED Final    Heavy chain components of IgD and IgE not tested.    GÉNESIS Interpretation:U 03/07/2024 See Comment:   Final    Comment: GÉNESIS paraprotein detected. Data suggests monoclonal gammopathy type  IgG Lambda.  NOTE  Testing performed at:  [facility], [address] [place] [address]  [address] CLIA  #:[id number]      Total IgE 03/07/2024 377 (H)  0 - 100 IU/mL Final    Comment: NOTE  Testing performed at:  [facility], [address] [place] [address]  [address] CLIA #:[id number]      Albumin, Electrophoresis 03/07/2024 3.21  3.2 - 5.3 g/dL Final    Albumin/Globulin Ratio 03/07/2024 0.92 (L)  1.0 - 2.7 g/dL Final    Alpha 1 03/07/2024 0.32  0.1 - 0.4 g/dL Final    Alpha 2 03/07/2024 0.81  0.6 - 1 g/dL Final    BETA 03/07/2024 1.17  0.6 - 1.3 g/dL Final    GAMMA 03/07/2024 1.18  0.7 - 1.5 g/dL Final    RESTRICTED BAND 03/07/2024 0.24 (H)  0 g/dL Final    Pepsin A Interpretation 03/07/2024 See Comment:   Final    Comment: There is a paraprotein present in the gamma region of the serum  protein electrophoresis.  Interpretation by Clint Rogers MD  Authorized Signature  NOTE  Testing performed at:  [facility], [address] [place] [address]  [address] CLIA #:[id number]      Uric Acid 03/07/2024 4.9  3.4 - 7.0 mg/dL Final    Comment: NOTE  Testing performed at:  [facility], [address] [place] [address]  [address] CLIA #:[id number]      SEGMENTERS 03/07/2024 68  34 - 71 % Final    Bands 03/07/2024 1  1 - 5 % Final    Lymphocytes 03/07/2024 26  19 - 53 % Final    Monocytes 03/07/2024 2 (L)  5 - 13 % Final    Eosinophils 03/07/2024 3  1 - 7 % Final    Neutrophils Absolute 03/07/2024 1.49 (L)  2.32 - 6.70 X 10 3/ul Final    Lymphocytes Absolute 03/07/2024 0.56 (L)  0.86 - 4.75 X 10 3/ul Final    ABSOLUTE MONOCYTE 03/07/2024 0.04 (L)  0.22 - 1.08 X 10 3/ul Final    ABSOLUTE EOSINOPHIL 03/07/2024 0.06  0.04 - 0.54 X 10 3/ul Final    ABSOLUTE BASOPHIL 03/07/2024 0.00  0.00 - 0.22 X 10 3/ul Final    RBC & PLT MORPHOLOGY 03/07/2024    Final    Comment: 1+ ANISO  GIANT PLATELETS NOTED      Total Granulocytes 03/07/2024 1.56  X 10 3/UL Final    Comment: NOTE  Testing performed at:  [facility], [address] [place] [address] CLIA #:[id number]          No results found in  the last 30 days.       Duration of encounter:  minutes  This includes face-to-face time and non face-to-face time preparing to see the patient (eg, review of tests), obtaining and/or reviewing separately obtained history, documenting clinical information in the electronic or other health record, independently interpreting resultsand communicating results to the patient/family/caregiver, or care coordination      DISCLAIMER: This note was prepared with [employer] voice recognition transcription software. Garbled syntax, mangled pronouns, and other bizarre constructions may be attributed to that software system.     All diagnostic data (labs/imaging) was reviewed with the patient and/or family member in the room. All preventative measures (vaccinations, screenings, testing, imaging) were discussed in depth and offered to the patient in accordance with current medical guidelines to ensure the patient is up to date.  All questions were answered to their liking. The patient and/or family member voiced understanding of all instructions provided. Expectations regarding follow up and treatment plan were voiced and confirmed prior to departure. The patient was given orders/instructions at the end of the visit for reference. They were instructed to notify my office if they have not been contacted for imaging/referrals/labs/results in 1-2 weeks. They voiced understanding of all of the above.     Follow up:     Patient Instructions   GUIDELINES FOR LOW CHOLESTEROL, LOW-TRIGLYCERIDE DIETS  FOODS TO AVOID    MEATS & FISH Marbled beef, pork, guadarrama, sausage, and other pork products; fatty fowl (duck, goose); skin  and fat of turkey and chicken; processed meats; luncheon meats (salami, bologna); hot dogs  and fast-food hamburgers (they're loaded with fat); organ meats (kidneys, liver); canned fish  packed in oil.    EGGS Limit egg yolks to two per week.    FRUITS Coconuts (rich in saturated fats).  VEGETABLES Starchy vegetables  "(potatoes, corn, lima beans, dried peas. beans) may be used only if  substitutes for a serving of bread or cereal. (Baked potato skin, however, is desirable for its  fiber content.)    BEAN'S Commercial baked beans with sugar and or pork added.  NUTS Limit peanuts. Walnuts and almonds are more preferable type nuts.  BREADS & GRAINS Any baked goods with shortening and/or sugar. Commercial mixes with dried eggs and  whole milk. Avoid sweet rolls, doughnuts, breakfast pastries (Setswana). and sweetened  packaged cereals (the added sugar converts readily to triglycerides).    MILK PRODUCTS Whole milk and whole milk packaged goods; cream; ice cream: whole-milk puddings,  yogurt, or cheeses; nondairy cream substitutes.    FATS & OILS Butter, lard, animal fats, guadarrama drippings, gravies, cream sauces as well as palm and coconut  oils. All these are high in saturated fats. Examine labels on "cholesterol free-products for  hydrogenated fats" (These are oils that have been hardened into solids and in the process  have become saturated.)    DESSERTS Fried snack foods like potato chips; chocolate; candies in general; jams; jellies; &  & SNACKS syrups; whole-milk puddings; ice cream and milk sherbets; hydrogenated peanut butter.  BEVERAGES Sugared fruit juices and soft drinks; cocoa made with whole milk and or sugar. When using  alcohol (1/2 oz liquor, 12 oz beer, 5 oz dry table wine per serving "), one serving may be  substituted for one bread or cereal serving (limit: two servings of alcohol per day).    SPECIAL NOTES  1. Remember that even non-limited foods should be used in moderation.  2. While on a cholesterol-lowering diet, be sure to avoid animal fats and marbled meats.  3. While on a triglyceride lowering diet, be sure to avoid sweets and to control the amount  of carbohydrates you eat (starchy foods such as flour, bread, and potatoes). Buy a good  low-fat cookbook, such as the one published by the American Heart " Association.  4. Consult your physician if you have any questions.  SEE REVERSE SIDE FOR FOODS TO USE  My docs/Clinic/Guidelines for Low........([place]) Revised 10-26-11  GUIDELINES FOR LOW CHOLESTEROL, LOW-TRIGLYCERIDE DIETS  FOODS TO USE  MEAT & FISH Choose lean meats (chicken, turkey, veal, and nonfatty cuts of beef with excess fat trimmed;  one serving = 3 oz of cooked meat). Also, fresh or frozen fish, canned fish packed in water,  and shellfish (lobster, crabs, shrimp, oysters). Limit use to no more than one sensing of one  of these per week. Shellfish are high in cholesterol but low in saturated fat and should be  used sparingly. Meats and fish should be broiled (pan or oven) or baked on a rack.  EGGS Egg substitutes and egg whites (use freely). Egg yolks (limit two per week).  FRUITS Eat three servings of fresh fruit per day (1 serving = ½ cup). Be sure to have at least one  citrus fruit daily. Frozen or canned fruit with no sugar or syrup added may be used.  VEGETABLES Most vegetables are not limited (see reverse side). One dark green (string beans, escarole) or  one deep yellow (squash) vegetable is recommended daily. Cauliflower, broccoli, and  celery, as well as potato skins are recommended for their fiber content. (Fiber is associated  with cholesterol reduction.) It is preferable to steam vegetables, but they may be boiled,  strained, or braised with polyunsaturated vegetable oil (see below).  BEANS Dried peas or beans (1 serving = ½ cup) may be used as a bread substitute.  NUTS Almonds, walnuts, and peanuts may be used sparingly (1 serving = 1 tablespoonful). Use  pumpkin, sesame, or sunflower seeds.  BREAD & GRAINS One roll or one slice of whole grain or enriched bread may be used, or three soda crackers  or four pieces of william toast as a substitute. Spaghetti, rice or noodles (½ cup) or ½ large  ear of corn may be used as a bread substitute. In preparing these foods, do not use butter  or  shortening, use soft margarine. Also use egg and sugar substitutes. Choose high fiber grains,  such as oats and whole wheat.  CEREALS Use ½ cup of hot cereal or ¾ cup of cold cereal per day. Add a sugar substitute if desired,  with 99% fat-free or skim milk.  MILK PRODUCTS Always use 99% fat free or skim milk, dairy products such as low fat cheeses (farmer's,  uncreamed diet cottage), low fat yogurt, and powdered skim milk.  FATS & OILS Use soft (not stick) margarine; vegetable oils that are high in polyunsaturated fats (such as  safflower, sunflower, soybean, corn, and cottonseed). Always refrigerate meat drippings to  harden the fat and remove it before preparing gravies.  DESSERT Limit to two servings per day; substitute each serving for a bread/cereal serving: ice milk,  & SNACKS water sherbet (¼ cup); unflavored gelatin or gelatin flavored with sugar substitute (1/3  cup); pudding prepared with skim milk (½ cup); egg white souffles: unbuttered popcorn (1  1/2 cups). Substitute carob for chocolate.  BEVERAGES Fresh fruit juices (limit 4 oz per day); black coffee, plain or herbal teas; soft drinks with  sugar substitutes; club soda, preferably salt free; cocoa made with skim milk; or nonfat dried  milk and water (sugar substitute added if desired); clear broth. Alcohol: limit two servings  per day (see reverse side).  MISCELLANEOUS You may use the following freely: vinegar, spices, herbs, nonfat bouillon, mustard.  Worcestershire sauce, soy sauce, flavoring essence.       Follow up in about 6 months (around 11/8/2025), or if symptoms worsen or fail to improve.

## 2025-05-08 NOTE — PATIENT INSTRUCTIONS
"GUIDELINES FOR LOW CHOLESTEROL, LOW-TRIGLYCERIDE DIETS  FOODS TO AVOID    MEATS & FISH Marbled beef, pork, guadarrama, sausage, and other pork products; fatty fowl (duck, goose); skin  and fat of turkey and chicken; processed meats; luncheon meats (salami, bologna); hot dogs  and fast-food hamburgers (they're loaded with fat); organ meats (kidneys, liver); canned fish  packed in oil.    EGGS Limit egg yolks to two per week.    FRUITS Coconuts (rich in saturated fats).  VEGETABLES Starchy vegetables (potatoes, corn, lima beans, dried peas. beans) may be used only if  substitutes for a serving of bread or cereal. (Baked potato skin, however, is desirable for its  fiber content.)    BEAN'S Commercial baked beans with sugar and or pork added.  NUTS Limit peanuts. Walnuts and almonds are more preferable type nuts.  BREADS & GRAINS Any baked goods with shortening and/or sugar. Commercial mixes with dried eggs and  whole milk. Avoid sweet rolls, doughnuts, breakfast pastries (Romanian). and sweetened  packaged cereals (the added sugar converts readily to triglycerides).    MILK PRODUCTS Whole milk and whole milk packaged goods; cream; ice cream: whole-milk puddings,  yogurt, or cheeses; nondairy cream substitutes.    FATS & OILS Butter, lard, animal fats, guadarrama drippings, gravies, cream sauces as well as palm and coconut  oils. All these are high in saturated fats. Examine labels on "cholesterol free-products for  hydrogenated fats" (These are oils that have been hardened into solids and in the process  have become saturated.)    DESSERTS Fried snack foods like potato chips; chocolate; candies in general; jams; jellies; &  & SNACKS syrups; whole-milk puddings; ice cream and milk sherbets; hydrogenated peanut butter.  BEVERAGES Sugared fruit juices and soft drinks; cocoa made with whole milk and or sugar. When using  alcohol (1/2 oz liquor, 12 oz beer, 5 oz dry table wine per serving "), one serving may be  substituted for one " bread or cereal serving (limit: two servings of alcohol per day).    SPECIAL NOTES  1. Remember that even non-limited foods should be used in moderation.  2. While on a cholesterol-lowering diet, be sure to avoid animal fats and marbled meats.  3. While on a triglyceride lowering diet, be sure to avoid sweets and to control the amount  of carbohydrates you eat (starchy foods such as flour, bread, and potatoes). Buy a good  low-fat cookbook, such as the one published by the American Heart Association.  4. Consult your physician if you have any questions.  SEE REVERSE SIDE FOR FOODS TO USE  My docs/Clinic/Guidelines for Low........(Melissa Ng) Revised 10-26-11  GUIDELINES FOR LOW CHOLESTEROL, LOW-TRIGLYCERIDE DIETS  FOODS TO USE  MEAT & FISH Choose lean meats (chicken, turkey, veal, and nonfatty cuts of beef with excess fat trimmed;  one serving = 3 oz of cooked meat). Also, fresh or frozen fish, canned fish packed in water,  and shellfish (lobster, crabs, shrimp, oysters). Limit use to no more than one sensing of one  of these per week. Shellfish are high in cholesterol but low in saturated fat and should be  used sparingly. Meats and fish should be broiled (pan or oven) or baked on a rack.  EGGS Egg substitutes and egg whites (use freely). Egg yolks (limit two per week).  FRUITS Eat three servings of fresh fruit per day (1 serving = ½ cup). Be sure to have at least one  citrus fruit daily. Frozen or canned fruit with no sugar or syrup added may be used.  VEGETABLES Most vegetables are not limited (see reverse side). One dark green (string beans, escarole) or  one deep yellow (squash) vegetable is recommended daily. Cauliflower, broccoli, and  celery, as well as potato skins are recommended for their fiber content. (Fiber is associated  with cholesterol reduction.) It is preferable to steam vegetables, but they may be boiled,  strained, or braised with polyunsaturated vegetable oil (see below).  BEANS Dried peas or  beans (1 serving = ½ cup) may be used as a bread substitute.  NUTS Almonds, walnuts, and peanuts may be used sparingly (1 serving = 1 tablespoonful). Use  pumpkin, sesame, or sunflower seeds.  BREAD & GRAINS One roll or one slice of whole grain or enriched bread may be used, or three soda crackers  or four pieces of william toast as a substitute. Spaghetti, rice or noodles (½ cup) or ½ large  ear of corn may be used as a bread substitute. In preparing these foods, do not use butter or  shortening, use soft margarine. Also use egg and sugar substitutes. Choose high fiber grains,  such as oats and whole wheat.  CEREALS Use ½ cup of hot cereal or ¾ cup of cold cereal per day. Add a sugar substitute if desired,  with 99% fat-free or skim milk.  MILK PRODUCTS Always use 99% fat free or skim milk, dairy products such as low fat cheeses (farmer's,  uncreamed diet cottage), low fat yogurt, and powdered skim milk.  FATS & OILS Use soft (not stick) margarine; vegetable oils that are high in polyunsaturated fats (such as  safflower, sunflower, soybean, corn, and cottonseed). Always refrigerate meat drippings to  harden the fat and remove it before preparing gravies.  DESSERT Limit to two servings per day; substitute each serving for a bread/cereal serving: ice milk,  & SNACKS water sherbet (¼ cup); unflavored gelatin or gelatin flavored with sugar substitute (1/3  cup); pudding prepared with skim milk (½ cup); egg white souffles: unbuttered popcorn (1  1/2 cups). Substitute carob for chocolate.  BEVERAGES Fresh fruit juices (limit 4 oz per day); black coffee, plain or herbal teas; soft drinks with  sugar substitutes; club soda, preferably salt free; cocoa made with skim milk; or nonfat dried  milk and water (sugar substitute added if desired); clear broth. Alcohol: limit two servings  per day (see reverse side).  MISCELLANEOUS You may use the following freely: vinegar, spices, herbs, nonfat bouillon, mustard.  [facility]  sauce, soy sauce, flavoring essence.

## 2025-07-09 ENCOUNTER — OFFICE VISIT (OUTPATIENT)
Dept: FAMILY MEDICINE | Facility: CLINIC | Age: 75
End: 2025-07-09
Payer: MEDICARE

## 2025-07-09 VITALS
HEART RATE: 63 BPM | SYSTOLIC BLOOD PRESSURE: 137 MMHG | OXYGEN SATURATION: 97 % | TEMPERATURE: 98 F | DIASTOLIC BLOOD PRESSURE: 71 MMHG | WEIGHT: 216 LBS | BODY MASS INDEX: 30.13 KG/M2

## 2025-07-09 DIAGNOSIS — J03.00 STREP TONSILLITIS: Primary | ICD-10-CM

## 2025-07-09 RX ORDER — FLUCONAZOLE 150 MG/1
150 TABLET ORAL ONCE
Qty: 1 TABLET | Refills: 0 | Status: SHIPPED | OUTPATIENT
Start: 2025-07-09 | End: 2025-07-09

## 2025-07-09 NOTE — PROGRESS NOTES
Subjective:      Patient ID: Mike Rome is a 74 y.o. male.    Chief Complaint: THROAT SWELLING X1 DAY; DIFFICULTY SWALLOWING; NO OTHER SYM      74-year-old male with a history of hyperlipidemia, ED, rheumatoid factor positive, adenocarcinoma prostate, macrocytic anemia with vitamin B12 deficiency, prediabetes, cholelithiasis, chronic right flank pain, colon polyp       Patient is here today with complaints of sore throat, hoarseness,  and difficulty swallowing pills x1 day.  He denies fever, chills, body aches.  Denies upper respiratory complaints.  Denies wheezing, shortness for breath, chest pain.           No other issues reported                 Past Medical History:   Diagnosis Date    Abnormal prostate specific antigen (PSA)     Cholelithiasis without obstruction     Erectile dysfunction     Gallstone     Hyperglycemia     Hyperlipidemia     Polyp of colon     Prediabetes     Prolapsed lumbosacral intervertebral disc     Rheumatoid arthritis, unspecified 2024    DR [place] - RHEUM    Rheumatoid factor positive     Shoulder joint pain     rt      Social History     Socioeconomic History    Marital status:    Tobacco Use    Smoking status: Every Day     Current packs/day: 1.00     Average packs/day: 1 pack/day for 50.0 years (50.0 ttl pk-yrs)     Types: Cigarettes    Smokeless tobacco: Never   Substance and Sexual Activity    Alcohol use: Yes    Drug use: Never     Social Drivers of Health     Financial Resource Strain: Low Risk  (3/26/2025)    Received from [facility]    Overall Financial Resource Strain (CARDIA)     Difficulty of Paying Living Expenses: Not hard at all   Food Insecurity: No Food Insecurity (3/26/2025)    Received from [facility]    Hunger Vital Sign     Worried About Running Out of Food in the Last Year: Never true     Ran Out of Food in  the Last Year: Never true   Transportation Needs: No Transportation Needs (3/26/2025)    [facility]    PRAPARE - Transportation     Lack of Transportation (Medical): No     Lack of Transportation (Non-Medical): No   Physical Activity: Inactive (3/26/2025)    [facility]    Exercise Vital Sign     Days of Exercise per Week: 0 days     Minutes of Exercise per Session: 0 min   Stress: No Stress Concern Present (3/26/2025)    [facility]    [place] [employer] - Occupational Stress Questionnaire     Feeling of Stress : Not at all   Housing Stability: Unknown (3/26/2025)    [facility]    Housing Stability Vital Sign     Unable to Pay for Housing in the Last Year: No     Homeless in the Last Year: No      No family history on file.     ROS:   Review of Systems   Constitutional:  Negative for appetite change, chills and fever.   HENT:  Positive for sore throat, trouble swallowing and voice change. Negative for congestion, facial swelling, postnasal drip, sinus pain and tinnitus.    Respiratory:  Negative for chest tightness, shortness of breath and wheezing.    Cardiovascular:  Negative for chest pain and palpitations.   Gastrointestinal:  Negative for abdominal pain, diarrhea and vomiting.   Genitourinary:  Negative for difficulty urinating, flank pain and hematuria.   Musculoskeletal:  Negative for back pain and neck pain.   Skin:  Negative for rash.   Neurological:  Negative for dizziness, tremors, seizures, weakness, light-headedness and headaches.   Psychiatric/Behavioral:  Negative for agitation, confusion, decreased concentration, dysphoric mood,  hallucinations, sleep disturbance and suicidal ideas. The patient is not nervous/anxious.      Objective:   Physical Exam  Vitals and nursing note reviewed.   Constitutional:       General: He is awake. He is not in acute distress.     Appearance: Normal appearance. He is well-developed, well-groomed and normal weight. He is not ill-appearing.   HENT:      Head: Normocephalic and atraumatic.      Right Ear: External ear normal.      Left Ear: External ear normal.      Nose: Nose normal.      Mouth/Throat:      Lips: Pink.      Mouth: Mucous membranes are moist.      Dentition: No gingival swelling or dental abscesses.      Tongue: No lesions.      Palate: No mass and lesions.      Pharynx: Uvula midline. Oropharyngeal exudate and posterior oropharyngeal erythema present. No uvula swelling.      Tonsils: Tonsillar exudate present. No tonsillar abscesses.     Eyes:      General: No scleral icterus.  Cardiovascular:      Rate and Rhythm: Normal rate.   Pulmonary:      Effort: Pulmonary effort is normal.   Skin:     Coloration: Skin is not jaundiced.      Findings: No rash.   Neurological:      Mental Status: He is alert and oriented to person, place, and time. Mental status is at baseline.   Psychiatric:         Attention and Perception: Attention and perception normal.         Mood and Affect: Mood and affect normal.         Speech: Speech normal.         Behavior: Behavior normal. Behavior is cooperative.         Thought Content: Thought content normal.         Cognition and Memory: Cognition and memory normal.         Judgment: Judgment normal.       Assessment:     1. Strep tonsillitis      No images are attached to the encounter.   Plan:     Problem List Items Addressed This Visit    None  Visit Diagnoses         Strep tonsillitis    -  Primary    pt advised to f/u on Friday if no improvement.    Relevant Medications    penicillin G benzathine (BICILLIN LA) injection 1.2 Million Units (Start on 7/9/2025 10:00 AM)           Procedures     No visits with results within 1 Month(s) from this visit.   Latest known visit with results is:   Office Visit on 03/07/2024   Component Date Value Ref Range Status    WBC 03/07/2024 2.16 (L)  4.3 - 10.8 X 10 3/ul Final    RBC 03/07/2024 4.32 (L)  4.7 - 6.1 X 10 6/ul Final    RDW-SD 03/07/2024 50.9  37 - 54 fl Final    Hemoglobin 03/07/2024 14.2  14 - 18 g/dL Final    Hematocrit 03/07/2024 42.6  42 - 52 % Final    MCV 03/07/2024 98.6 (H)  80 - 94 fl Final    MCH 03/07/2024 32.9 (H)  27 - 32 pg Final    MCHC 03/07/2024 33.3  32 - 36 g/dL Final    Platelets 03/07/2024 143  135 - 400 X 10 3/ul Final    nRBC# 03/07/2024 0.0  0 - 0.2 /100 WBC Final    nRBC Count Absolute 03/07/2024 0.000  0 - 0.012 x 10 3/ul Final    Comment: NOTE  Testing performed at:  [facility], [address] [place] [address] CLIA #:[id number]      Glucose 03/07/2024 105  82 - 115 mg/dL Final    BUN 03/07/2024 17.1  8 - 23 mg/dL Final    Creatinine 03/07/2024 0.79  0.70 - 1.20 mg/dL Final    AST 03/07/2024 23  0 - 40 U/L Final    ALT (SGPT) 03/07/2024 19  0 - 41 U/L Final    Alkaline Phosphatase 03/07/2024 87  40 - 130 U/L Final    Calcium 03/07/2024 8.8  8.6 - 10.2 mg/dL Final    Protein, Total 03/07/2024 7.2  6.4 - 8.3 g/dL Final    Albumin 03/07/2024 3.7  3.5 - 5.2 g/dL Final    BILIRUBIN, TOTAL 03/07/2024 0.48  0.00 - 1.20 mg/dL Final    Sodium 03/07/2024 142  136 - 145 mmol/L Final    Potassium 03/07/2024 4.5  3.5 - 5.1 mmol/L Final    Chloride 03/07/2024 106  98 - 107 mmol/L Final    CO2 03/07/2024 26  22 - 29 mmol/L Final    Globulin 03/07/2024 3.5  1.5 - 4.5 g/dL Final    Albumin/Globulin Ratio 03/07/2024 1.1  1.0 - 2.7 Final    BUN/Creatinine Ratio 03/07/2024 21.6 (H)  6 - 20 Final    GFR ESTIMATION 03/07/2024 92.64  >60.00 mL/min/1.73m2 Final    Anion Gap 03/07/2024 10.0  8.0 - 17.0 mmol/L Final    Comment: NOTE  Testing performed at:  [facility], [address] [place] [address]   [id number] CLIA #:[id number]      SED RATE (WESTERGREN) 03/07/2024 28 (H)  0 - 20 mm/hr Final    Comment: NOTE  Testing performed at:  [facility], [address] [place] [address]  [address] CLIA #:[id number]      CRP QUANTITATIVE 03/07/2024 17.4 (H)  <5.0 mg/L Final    CRP QUALITATIVE 03/07/2024 POSITIVE  mg/L Final    Comment: NOTE  Testing performed at:  [facility], [address] [place], [place] [id number] CLIA #:[id number]      Additional Testing 03/07/2024 SEE BELOW   Final    Comment: Additional testing was collected and sent to a reference lab. Results  may take 7 days to 2 weeks before they are final. Reports will be sent  to ordering client via fax, pathviewer, mail, interface or   delivered.  NOTE  Testing performed at:  [facility], [address] [place] [address]  [address] CLIA #:[id number]      IgG 03/07/2024 1,115  700 - 1,600 mg/dL Final    IgA 03/07/2024 387  70 - 400 mg/dL Final    IgM 03/07/2024 54  40 - 230 mg/dL Final    Comment: NOTE  Testing performed at:  [facility], [address] [place] [address]  [address] CLIA #:[id number]      GÉNESIS Paraprotein, CSF 03/07/2024 DETECTED (A)  NOT DETECTED Final    Heavy chain components of IgD and IgE not tested.    GÉNESIS Interpretation:U 03/07/2024 See Comment:   Final    Comment: GÉNESIS paraprotein detected. Data suggests monoclonal gammopathy type  IgG Lambda.  NOTE  Testing performed at:  [facility], [address] [place] [address]  [address] CLIA #:[id number]      Total IgE 03/07/2024 377 (H)  0 - 100 IU/mL Final    Comment: NOTE  Testing performed at:  [facility], [address] [place] [address]  [address] CLIA #:[id number]      Albumin, Electrophoresis 03/07/2024 3.21  3.2 - 5.3 g/dL Final    Albumin/Globulin Ratio 03/07/2024 0.92 (L)  1.0 - 2.7 g/dL Final    Alpha 1 03/07/2024 0.32  0.1 - 0.4 g/dL Final    Alpha 2 03/07/2024 0.81  0.6 - 1 g/dL Final    BETA 03/07/2024 1.17  0.6 - 1.3 g/dL Final     GAMMA 03/07/2024 1.18  0.7 - 1.5 g/dL Final    RESTRICTED BAND 03/07/2024 0.24 (H)  0 g/dL Final    Pepsin A Interpretation 03/07/2024 See Comment:   Final    Comment: There is a paraprotein present in the gamma region of the serum  protein electrophoresis.  Interpretation by Clint Rogers MD  Authorized Signature  NOTE  Testing performed at:  [facility], [address] [place] [address]  [address] CLIA #:[id number]      Uric Acid 03/07/2024 4.9  3.4 - 7.0 mg/dL Final    Comment: NOTE  Testing performed at:  [facility], [address] [place] [address]  [address] CLIA #:[id number]      SEGMENTERS 03/07/2024 68  34 - 71 % Final    Bands 03/07/2024 1  1 - 5 % Final    Lymphocytes 03/07/2024 26  19 - 53 % Final    Monocytes 03/07/2024 2 (L)  5 - 13 % Final    Eosinophils 03/07/2024 3  1 - 7 % Final    Neutrophils Absolute 03/07/2024 1.49 (L)  2.32 - 6.70 X 10 3/ul Final    Lymphocytes Absolute 03/07/2024 0.56 (L)  0.86 - 4.75 X 10 3/ul Final    ABSOLUTE MONOCYTE 03/07/2024 0.04 (L)  0.22 - 1.08 X 10 3/ul Final    ABSOLUTE EOSINOPHIL 03/07/2024 0.06  0.04 - 0.54 X 10 3/ul Final    ABSOLUTE BASOPHIL 03/07/2024 0.00  0.00 - 0.22 X 10 3/ul Final    RBC & PLT MORPHOLOGY 03/07/2024    Final    Comment: 1+ ANISO  GIANT PLATELETS NOTED      Total Granulocytes 03/07/2024 1.56  X 10 3/UL Final    Comment: NOTE  Testing performed at:  [facility], [address] [place] [address]  [address] CLIA #:[id number]          No results found in the last 30 days.       Duration of encounter:  minutes  This includes face-to-face time and non face-to-face time preparing to see the patient (eg, review of tests), obtaining and/or reviewing separately obtained history, documenting clinical information in the electronic or other health record, independently interpreting resultsand communicating results to the patient/family/caregiver, or care coordination      DISCLAIMER: This note was prepared with MModal voice  recognition transcription software. Garbled syntax, mangled pronouns, and other bizarre constructions may be attributed to that software system.     All diagnostic data (labs/imaging) was reviewed with the patient and/or family member in the room. All preventative measures (vaccinations, screenings, testing, imaging) were discussed in depth and offered to the patient in accordance with current medical guidelines to ensure the patient is up to date.  All questions were answered to their liking. The patient and/or family member voiced understanding of all instructions provided. Expectations regarding follow up and treatment plan were voiced and confirmed prior to departure. The patient was given orders/instructions at the end of the visit for reference. They were instructed to notify my office if they have not been contacted for imaging/referrals/labs/results in 1-2 weeks. They voiced understanding of all of the above.     Follow up:     There are no Patient Instructions on file for this visit.     Follow up in about 2 days (around 7/11/2025), or if symptoms worsen or fail to improve.